# Patient Record
Sex: FEMALE | Employment: OTHER | URBAN - METROPOLITAN AREA
[De-identification: names, ages, dates, MRNs, and addresses within clinical notes are randomized per-mention and may not be internally consistent; named-entity substitution may affect disease eponyms.]

---

## 2017-11-02 ENCOUNTER — PRE VISIT (OUTPATIENT)
Dept: UROLOGY | Facility: CLINIC | Age: 42
End: 2017-11-02

## 2018-01-02 ENCOUNTER — PRE VISIT (OUTPATIENT)
Dept: UROLOGY | Facility: CLINIC | Age: 43
End: 2018-01-02

## 2018-01-02 NOTE — TELEPHONE ENCOUNTER
Patient with history of urethral diverticulum coming in for consult with Dr. Orellana. Patient chart reviewed, no need for call, all records available and ready for appointment.

## 2022-01-25 ENCOUNTER — HOSPITAL ENCOUNTER (EMERGENCY)
Facility: CLINIC | Age: 47
Discharge: HOME | End: 2022-01-25
Attending: EMERGENCY MEDICINE | Admitting: EMERGENCY MEDICINE
Payer: MEDICARE

## 2022-01-25 ENCOUNTER — TRANSFERRED RECORDS (OUTPATIENT)
Dept: HEALTH INFORMATION MANAGEMENT | Facility: CLINIC | Age: 47
End: 2022-01-25

## 2022-01-25 ENCOUNTER — APPOINTMENT (OUTPATIENT)
Dept: CT IMAGING | Facility: CLINIC | Age: 47
End: 2022-01-25
Attending: EMERGENCY MEDICINE
Payer: MEDICARE

## 2022-01-25 VITALS
HEART RATE: 62 BPM | DIASTOLIC BLOOD PRESSURE: 94 MMHG | RESPIRATION RATE: 16 BRPM | WEIGHT: 277 LBS | TEMPERATURE: 97.8 F | OXYGEN SATURATION: 99 % | SYSTOLIC BLOOD PRESSURE: 160 MMHG

## 2022-01-25 DIAGNOSIS — Q60.0 UNILATERAL CONGENITAL ABSENCE OF KIDNEY: ICD-10-CM

## 2022-01-25 DIAGNOSIS — N13.2 HYDRONEPHROSIS WITH URINARY OBSTRUCTION DUE TO RENAL CALCULUS: ICD-10-CM

## 2022-01-25 DIAGNOSIS — N17.9 ACUTE RENAL FAILURE, UNSPECIFIED ACUTE RENAL FAILURE TYPE (H): ICD-10-CM

## 2022-01-25 LAB
ALBUMIN SERPL-MCNC: 2.9 G/DL (ref 3.4–5)
ALP SERPL-CCNC: 78 U/L (ref 40–150)
ALT SERPL W P-5'-P-CCNC: 24 U/L (ref 0–50)
ANION GAP SERPL CALCULATED.3IONS-SCNC: 6 MMOL/L (ref 3–14)
AST SERPL W P-5'-P-CCNC: 18 U/L (ref 0–45)
B-HCG SERPL-ACNC: 3 IU/L (ref 0–5)
BASOPHILS # BLD AUTO: 0 10E3/UL (ref 0–0.2)
BASOPHILS NFR BLD AUTO: 0 %
BILIRUB SERPL-MCNC: 0.5 MG/DL (ref 0.2–1.3)
BUN SERPL-MCNC: 40 MG/DL (ref 7–30)
CALCIUM SERPL-MCNC: 9 MG/DL (ref 8.5–10.1)
CHLORIDE BLD-SCNC: 104 MMOL/L (ref 94–109)
CO2 SERPL-SCNC: 27 MMOL/L (ref 20–32)
CREAT SERPL-MCNC: 4.37 MG/DL (ref 0.52–1.04)
EOSINOPHIL # BLD AUTO: 0.2 10E3/UL (ref 0–0.7)
EOSINOPHIL NFR BLD AUTO: 2 %
ERYTHROCYTE [DISTWIDTH] IN BLOOD BY AUTOMATED COUNT: 11.8 % (ref 10–15)
GFR SERPL CREATININE-BSD FRML MDRD: 12 ML/MIN/1.73M2
GLUCOSE BLD-MCNC: 99 MG/DL (ref 70–99)
HCT VFR BLD AUTO: 44.3 % (ref 35–47)
HGB BLD-MCNC: 14.5 G/DL (ref 11.7–15.7)
HOLD SPECIMEN: NORMAL
IMM GRANULOCYTES # BLD: 0.1 10E3/UL
IMM GRANULOCYTES NFR BLD: 1 %
LACTATE SERPL-SCNC: 0.8 MMOL/L (ref 0.7–2)
LIPASE SERPL-CCNC: 121 U/L (ref 73–393)
LYMPHOCYTES # BLD AUTO: 1.7 10E3/UL (ref 0.8–5.3)
LYMPHOCYTES NFR BLD AUTO: 15 %
MCH RBC QN AUTO: 31.8 PG (ref 26.5–33)
MCHC RBC AUTO-ENTMCNC: 32.7 G/DL (ref 31.5–36.5)
MCV RBC AUTO: 97 FL (ref 78–100)
MONOCYTES # BLD AUTO: 1.1 10E3/UL (ref 0–1.3)
MONOCYTES NFR BLD AUTO: 10 %
NEUTROPHILS # BLD AUTO: 8.1 10E3/UL (ref 1.6–8.3)
NEUTROPHILS NFR BLD AUTO: 72 %
NRBC # BLD AUTO: 0 10E3/UL
NRBC BLD AUTO-RTO: 0 /100
PLATELET # BLD AUTO: 240 10E3/UL (ref 150–450)
POTASSIUM BLD-SCNC: 4 MMOL/L (ref 3.4–5.3)
PROT SERPL-MCNC: 6.9 G/DL (ref 6.8–8.8)
RBC # BLD AUTO: 4.56 10E6/UL (ref 3.8–5.2)
SARS-COV-2 RNA RESP QL NAA+PROBE: NEGATIVE
SODIUM SERPL-SCNC: 137 MMOL/L (ref 133–144)
WBC # BLD AUTO: 11.1 10E3/UL (ref 4–11)

## 2022-01-25 PROCEDURE — 96376 TX/PRO/DX INJ SAME DRUG ADON: CPT

## 2022-01-25 PROCEDURE — 96361 HYDRATE IV INFUSION ADD-ON: CPT

## 2022-01-25 PROCEDURE — 83605 ASSAY OF LACTIC ACID: CPT | Performed by: EMERGENCY MEDICINE

## 2022-01-25 PROCEDURE — 258N000003 HC RX IP 258 OP 636: Performed by: EMERGENCY MEDICINE

## 2022-01-25 PROCEDURE — 99285 EMERGENCY DEPT VISIT HI MDM: CPT | Performed by: EMERGENCY MEDICINE

## 2022-01-25 PROCEDURE — 82374 ASSAY BLOOD CARBON DIOXIDE: CPT | Performed by: EMERGENCY MEDICINE

## 2022-01-25 PROCEDURE — 99285 EMERGENCY DEPT VISIT HI MDM: CPT | Mod: 25

## 2022-01-25 PROCEDURE — 96375 TX/PRO/DX INJ NEW DRUG ADDON: CPT

## 2022-01-25 PROCEDURE — 83690 ASSAY OF LIPASE: CPT | Performed by: EMERGENCY MEDICINE

## 2022-01-25 PROCEDURE — 87635 SARS-COV-2 COVID-19 AMP PRB: CPT | Performed by: EMERGENCY MEDICINE

## 2022-01-25 PROCEDURE — C9803 HOPD COVID-19 SPEC COLLECT: HCPCS

## 2022-01-25 PROCEDURE — 99204 OFFICE O/P NEW MOD 45 MIN: CPT | Mod: 25 | Performed by: UROLOGY

## 2022-01-25 PROCEDURE — 74176 CT ABD & PELVIS W/O CONTRAST: CPT

## 2022-01-25 PROCEDURE — 96374 THER/PROPH/DIAG INJ IV PUSH: CPT

## 2022-01-25 PROCEDURE — 250N000011 HC RX IP 250 OP 636: Performed by: EMERGENCY MEDICINE

## 2022-01-25 PROCEDURE — 84702 CHORIONIC GONADOTROPIN TEST: CPT | Performed by: EMERGENCY MEDICINE

## 2022-01-25 PROCEDURE — 36415 COLL VENOUS BLD VENIPUNCTURE: CPT | Performed by: EMERGENCY MEDICINE

## 2022-01-25 PROCEDURE — 85049 AUTOMATED PLATELET COUNT: CPT | Performed by: EMERGENCY MEDICINE

## 2022-01-25 RX ORDER — DOCUSATE SODIUM 100 MG/1
100 CAPSULE, LIQUID FILLED ORAL
COMMUNITY
Start: 2021-10-11

## 2022-01-25 RX ORDER — ONDANSETRON 2 MG/ML
4 INJECTION INTRAMUSCULAR; INTRAVENOUS EVERY 30 MIN PRN
Status: COMPLETED | OUTPATIENT
Start: 2022-01-25 | End: 2022-01-25

## 2022-01-25 RX ORDER — HYDROMORPHONE HYDROCHLORIDE 1 MG/ML
0.5 INJECTION, SOLUTION INTRAMUSCULAR; INTRAVENOUS; SUBCUTANEOUS
Status: COMPLETED | OUTPATIENT
Start: 2022-01-25 | End: 2022-01-25

## 2022-01-25 RX ORDER — METOPROLOL SUCCINATE 100 MG/1
100 TABLET, EXTENDED RELEASE ORAL AT BEDTIME
COMMUNITY
Start: 2021-08-09

## 2022-01-25 RX ORDER — ACETAMINOPHEN AND CODEINE PHOSPHATE 120; 12 MG/5ML; MG/5ML
1 SOLUTION ORAL AT BEDTIME
COMMUNITY
Start: 2021-09-14 | End: 2022-09-14

## 2022-01-25 RX ORDER — NYSTATIN 100000 U/G
OINTMENT TOPICAL
COMMUNITY
Start: 2021-11-22

## 2022-01-25 RX ORDER — MULTIVITAMIN/IRON/FOLIC ACID 18MG-0.4MG
1 TABLET ORAL AT BEDTIME
COMMUNITY

## 2022-01-25 RX ORDER — PYRIDOXINE HCL (VITAMIN B6) 50 MG
50 TABLET ORAL
COMMUNITY
Start: 2021-06-01

## 2022-01-25 RX ORDER — ONDANSETRON 4 MG/1
TABLET, ORALLY DISINTEGRATING ORAL
COMMUNITY
Start: 2021-03-09

## 2022-01-25 RX ORDER — GABAPENTIN 300 MG/1
CAPSULE ORAL
COMMUNITY
Start: 2021-08-02

## 2022-01-25 RX ORDER — SODIUM CHLORIDE 9 MG/ML
INJECTION, SOLUTION INTRAVENOUS CONTINUOUS
Status: DISCONTINUED | OUTPATIENT
Start: 2022-01-25 | End: 2022-01-25 | Stop reason: HOSPADM

## 2022-01-25 RX ORDER — FAMOTIDINE 20 MG/1
20 TABLET, FILM COATED ORAL AT BEDTIME
COMMUNITY
Start: 2021-12-20

## 2022-01-25 RX ADMIN — SODIUM CHLORIDE 1000 ML: 9 INJECTION, SOLUTION INTRAVENOUS at 13:12

## 2022-01-25 RX ADMIN — ONDANSETRON 4 MG: 2 INJECTION INTRAMUSCULAR; INTRAVENOUS at 13:12

## 2022-01-25 RX ADMIN — HYDROMORPHONE HYDROCHLORIDE 0.5 MG: 1 INJECTION, SOLUTION INTRAMUSCULAR; INTRAVENOUS; SUBCUTANEOUS at 14:22

## 2022-01-25 RX ADMIN — HYDROMORPHONE HYDROCHLORIDE 0.5 MG: 1 INJECTION, SOLUTION INTRAMUSCULAR; INTRAVENOUS; SUBCUTANEOUS at 13:13

## 2022-01-25 RX ADMIN — HYDROMORPHONE HYDROCHLORIDE 0.5 MG: 1 INJECTION, SOLUTION INTRAMUSCULAR; INTRAVENOUS; SUBCUTANEOUS at 15:24

## 2022-01-25 RX ADMIN — ONDANSETRON 4 MG: 2 INJECTION INTRAMUSCULAR; INTRAVENOUS at 15:23

## 2022-01-25 RX ADMIN — ONDANSETRON 4 MG: 2 INJECTION INTRAMUSCULAR; INTRAVENOUS at 14:22

## 2022-01-25 NOTE — DISCHARGE INSTRUCTIONS
As discussed you have obstruction of your ureter on the right side causing some degree of kidney failure that should resolve with treatment of the obstruction.  You have a 1 cm kidney stone blocking the ureter.  Go directly to the [facility] do not eat or drink anything in route.  They will take you to the operating room as soon as possible after arrival.  If things go well you will be discharged as an outpatient there.  I hope this gets resolved today and you have no further trouble.  It was a pleasure meeting you.

## 2022-01-25 NOTE — H&P (VIEW-ONLY)
History     Chief Complaint   Patient presents with     Abdominal Pain     Flank pain. Constipation      HPI  Yaima Maxwell is a 46 year old female who presents to the emergency department secondary to abdominal pain.  This started yesterday morning and has been waxing waning in intensity.  Is primarily located in the right lower quadrant but goes up into the right upper quadrant and into the right flank.  No dysuria or hematuria.  She has had decreased urinary output.  She has not urinated since yesterday morning.  She has had constipation with hard pebble-like stools.  No recent complete evacuation.  She had some bowel movement about an hour prior to arrival.  No blood in her stool.  She has had some lack of appetite and some vomiting.  No abdominal distention.  She just had gastric bypass surgery in September.  No history of bowel obstructions.  She has not had a fever.  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  She has been pregnant 6 times and has had 4 miscarriages.  She has not had a menses for 53 days.  She is on oral contraceptive pills.    Allergies:  Allergies   Allergen Reactions     Imitrex [Sumatriptan] Anaphylaxis       Problem List:    There are no problems to display for this patient.       Past Medical History:    No past medical history on file.    Past Surgical History:    No past surgical history on file.    Family History:    No family history on file.    Social History:  Marital Status:   [2]  Social History     Tobacco Use     Smoking status: Not on file     Smokeless tobacco: Not on file   Substance Use Topics     Alcohol use: Not on file     Drug use: Not on file        Medications:    amitriptyline (ELAVIL) 25 MG tablet  cholecalciferol 50 MCG (2000 UT) CAPS  Cranberry 400 MG CAPS  cyanocobalamin 1000 MCG SUBL  docusate sodium (DSS) 100 MG capsule  famotidine (PEPCID) 20 MG tablet  gabapentin (NEURONTIN) 300 MG capsule  metoprolol succinate ER (TOPROL-XL) 100 MG 24  hr tablet  multivitamin w/minerals (CENTRUM ADULTS) tablet  norethindrone (MICRONOR) 0.35 MG tablet  nystatin (MYCOSTATIN) 880755 UNIT/GM external ointment  omeprazole (PRILOSEC) 20 MG DR capsule  ondansetron (ZOFRAN-ODT) 4 MG ODT tab  pyridOXINE (VITAMIN B-6) 50 MG tablet  Rimegepant Sulfate 75 MG TBDP          Review of Systems   All other systems reviewed and are negative.      Physical Exam   BP: (!) 162/100  Pulse: 59  Temp: 97.8  F (36.6  C)  Resp: 16  Weight: 125.6 kg (277 lb)  SpO2: 100 %      Physical Exam  Vitals and nursing note reviewed.   Constitutional:       General: She is not in acute distress.     Appearance: She is well-developed. She is not diaphoretic.   HENT:      Head: Normocephalic and atraumatic.   Eyes:      General: No scleral icterus.     Extraocular Movements: Extraocular movements intact.      Pupils: Pupils are equal, round, and reactive to light.   Cardiovascular:      Rate and Rhythm: Normal rate and regular rhythm.      Heart sounds: Normal heart sounds. No murmur heard.      Pulmonary:      Effort: Pulmonary effort is normal.      Breath sounds: Normal breath sounds.   Abdominal:      Tenderness: There is abdominal tenderness in the right upper quadrant and right lower quadrant. There is right CVA tenderness. There is no left CVA tenderness, guarding or rebound. Positive signs include McBurney's sign. Negative signs include Ayala's sign and Rovsing's sign.   Musculoskeletal:      Cervical back: Normal range of motion and neck supple.   Skin:     General: Skin is warm and dry.      Coloration: Skin is not pale.      Findings: No erythema or rash.   Neurological:      Mental Status: She is alert and oriented to person, place, and time.         ED Course                 Procedures                  Results for orders placed or performed during the hospital encounter of 01/25/22 (from the past 24 hour(s))   Extra Tube    Narrative    The following orders were created for panel order  Extra Tube.  Procedure                               Abnormality         Status                     ---------                               -----------         ------                     Extra Red Top Tube[[id number]]                               Final result                 Please view results for these tests on the individual orders.   Extra Red Top Tube   Result Value Ref Range    Hold Specimen [facility]    CBC with platelets differential    Narrative    The following orders were created for panel order CBC with platelets differential.  Procedure                               Abnormality         Status                     ---------                               -----------         ------                     CBC with platelets and d...[[id number]]  Abnormal            Final result                 Please view results for these tests on the individual orders.   Comprehensive metabolic panel   Result Value Ref Range    Sodium 137 133 - 144 mmol/L    Potassium 4.0 3.4 - 5.3 mmol/L    Chloride 104 94 - 109 mmol/L    Carbon Dioxide (CO2) 27 20 - 32 mmol/L    Anion Gap 6 3 - 14 mmol/L    Urea Nitrogen 40 (H) 7 - 30 mg/dL    Creatinine 4.37 (H) 0.52 - 1.04 mg/dL    Calcium 9.0 8.5 - 10.1 mg/dL    Glucose 99 70 - 99 mg/dL    Alkaline Phosphatase 78 40 - 150 U/L    AST 18 0 - 45 U/L    ALT 24 0 - 50 U/L    Protein Total 6.9 6.8 - 8.8 g/dL    Albumin 2.9 (L) 3.4 - 5.0 g/dL    Bilirubin Total 0.5 0.2 - 1.3 mg/dL    GFR Estimate 12 (L) >60 mL/min/1.73m2   Lipase   Result Value Ref Range    Lipase 121 73 - 393 U/L   Lactic acid whole blood   Result Value Ref Range    Lactic Acid 0.8 0.7 - 2.0 mmol/L   HCG quantitative pregnancy (blood)   Result Value Ref Range    hCG Quantitative 3 0 - 5 IU/L   CBC with platelets and differential   Result Value Ref Range    WBC Count 11.1 (H) 4.0 - 11.0 10e3/uL    RBC Count 4.56 3.80 - 5.20 10e6/uL    Hemoglobin 14.5 11.7 - 15.7 g/dL    Hematocrit 44.3 35.0 - 47.0 %    MCV 97 78 - 100 fL    MCH 31.8  26.5 - 33.0 pg    MCHC 32.7 31.5 - 36.5 g/dL    RDW 11.8 10.0 - 15.0 %    Platelet Count 240 150 - 450 10e3/uL    % Neutrophils 72 %    % Lymphocytes 15 %    % Monocytes 10 %    % Eosinophils 2 %    % Basophils 0 %    % Immature Granulocytes 1 %    NRBCs per 100 WBC 0 <1 /100    Absolute Neutrophils 8.1 1.6 - 8.3 10e3/uL    Absolute Lymphocytes 1.7 0.8 - 5.3 10e3/uL    Absolute Monocytes 1.1 0.0 - 1.3 10e3/uL    Absolute Eosinophils 0.2 0.0 - 0.7 10e3/uL    Absolute Basophils 0.0 0.0 - 0.2 10e3/uL    Absolute Immature Granulocytes 0.1 <=0.4 10e3/uL    Absolute NRBCs 0.0 10e3/uL   CT Abdomen Pelvis w/o Contrast    Narrative    CT ABDOMEN/PELVIS WITHOUT CONTRAST January 25, 2022 2:19 PM    CLINICAL HISTORY: Renal failure, solitary kidney, gastric bypass,  right-sided pain.    TECHNIQUE: CT scan of the abdomen and pelvis was performed without IV  contrast. Multiplanar reformats were obtained. Dose reduction  techniques were used.  CONTRAST: None.    COMPARISON: None.    FINDINGS:   LOWER CHEST: Tiny bilateral pleural fluid collections are noted.  Visualized portions of the lung bases and mediastinal contents are  otherwise grossly unremarkable.    HEPATOBILIARY: Mild focal fatty infiltration is seen in the liver  adjacent to falciform ligament. Gallbladder is distended but otherwise  unremarkable. Liver is otherwise normal in appearance. No focal  intrahepatic lesion, biliary ductal dilatation, choledocholithiasis,  or cholelithiasis is seen.    PANCREAS: Normal.    SPLEEN: Calcifications posterior aspect of the spleen is likely from  chronic granulomatous disease. Spleen is enlarged measuring 10.3 x  11.0 x 9.1 cm in the craniocaudal, transverse, and AP dimensions  respectively.    ADRENAL GLANDS: Normal.    KIDNEYS/BLADDER: There is mild right perinephric stranding. There is  mild to moderate right hydronephrosis and pyelectasis. There is a  right ureteropelvic junction calculus measuring 1.1 x 0.7 x 1.2 cm.  The  right ureters otherwise have normal caliber distally to this  location. Urinary bladder is nearly completely decompressed and cannot  be completely evaluated.    BOWEL: Colon, small bowel, and appendix are grossly of normal caliber  and appearance. Postop changes status post prior gastric surgery are  noted. This does not have the typical appearance of a Andrew-en-Y  bypass. This could represent a gastric sleeve.    LYMPH NODES: Normal.    VASCULATURE: There is mild nonaneurysmal atherosclerosis.    PELVIC ORGANS: Uterus and ovaries are grossly unremarkable.    OTHER: There is a small amount of free fluid in the pelvis. No free  air is identified.    MUSCULOSKELETAL: There are degenerative changes in the lower lumbar  spine. No aggressive osseous lesions or acute osseous fractures are  identified.      Impression    IMPRESSION:   1.  At least partially obstructive right ureteropelvic junction  calculus measures 1.1 x 0.7 x 1.2 cm. There is no left kidney. Further  evaluation with urologic consultation is recommended.  2.  Postoperative changes of the stomach from probable prior gastric  sleeve procedure.  3.  Evidence of chronic granulomatous disease of the spleen. There is  mild splenomegaly.    I discussed the findings of the obstructive right ureteropelvic  junction calculus with Dr. Hall on 1/25/2022 at 2:30 PM.       Medications   0.9% sodium chloride BOLUS (1,000 mLs Intravenous New Bag 1/25/22 1312)     Followed by   sodium chloride 0.9% infusion (has no administration in time range)   HYDROmorphone (PF) (DILAUDID) injection 0.5 mg (0.5 mg Intravenous Given 1/25/22 1422)   ondansetron (ZOFRAN) injection 4 mg (4 mg Intravenous Given 1/25/22 1422)       Assessments & Plan (with Medical Decision Making)  Right-sided abdominal pain, constipation, no menses for 53 days, congenital solitary kidney, congenital duplication of uterus.  Work-up revealed an obstructed right ureter at the UPJ secondary to a 1 cm x 7 mm x  1.2 cm obstructing stone with hydronephrosis.  Given she is only has 1 kidney this is resulted in renal failure.  She does not have any fever.  She was unable to produce urine for a urinalysis.  Discussed with Dr. Fontanez of urology who wanted the patient to come directly to [facility] for an outpatient surgical procedure with stent placement to relieve the obstruction.  The patient is n.p.o. since last night but has had a few sips of water today.  I advised her not to eat or drink anything from here forward.  Her  can drive her.  The IV was left in place.  Instructions to go to [facility] directly were given.  She understands the plan.  All questions were answered.  Surgery is currently planned for 5 PM but I advised her to go directly there.     I have reviewed the nursing notes.    I have reviewed the findings, diagnosis, plan and need for follow up with the patient.      New Prescriptions    No medications on file       Final diagnoses:   Hydronephrosis with urinary obstruction due to renal calculus   Acute renal failure, unspecified acute renal failure type (H)   Unilateral congenital absence of kidney       1/25/2022   [facility] EMERGENCY DEPT     Chau Hall MD  01/25/22 0889

## 2022-01-25 NOTE — ED NOTES
Pt discharged. Pt to go directly to [facility] for surgery as outpt. IV in place and covered. Dilaudid given prior to discharge.Vss stable. Pt and  feel comfortable with plan.

## 2022-01-25 NOTE — ED PROVIDER NOTES
History     Chief Complaint   Patient presents with     Abdominal Pain     Flank pain. Constipation      HPI  Yaima Maxwell is a 46 year old female who presents to the emergency department secondary to abdominal pain.  This started yesterday morning and has been waxing waning in intensity.  Is primarily located in the right lower quadrant but goes up into the right upper quadrant and into the right flank.  No dysuria or hematuria.  She has had decreased urinary output.  She has not urinated since yesterday morning.  She has had constipation with hard pebble-like stools.  No recent complete evacuation.  She had some bowel movement about an hour prior to arrival.  No blood in her stool.  She has had some lack of appetite and some vomiting.  No abdominal distention.  She just had gastric bypass surgery in September.  No history of bowel obstructions.  She has not had a fever.  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  She has been pregnant 6 times and has had 4 miscarriages.  She has not had a menses for 53 days.  She is on oral contraceptive pills.    Allergies:  Allergies   Allergen Reactions     Imitrex [Sumatriptan] Anaphylaxis       Problem List:    There are no problems to display for this patient.       Past Medical History:    No past medical history on file.    Past Surgical History:    No past surgical history on file.    Family History:    No family history on file.    Social History:  Marital Status:   [2]  Social History     Tobacco Use     Smoking status: Not on file     Smokeless tobacco: Not on file   Substance Use Topics     Alcohol use: Not on file     Drug use: Not on file        Medications:    amitriptyline (ELAVIL) 25 MG tablet  cholecalciferol 50 MCG (2000 UT) CAPS  Cranberry 400 MG CAPS  cyanocobalamin 1000 MCG SUBL  docusate sodium (DSS) 100 MG capsule  famotidine (PEPCID) 20 MG tablet  gabapentin (NEURONTIN) 300 MG capsule  metoprolol succinate ER (TOPROL-XL) 100 MG 24  hr tablet  multivitamin w/minerals (CENTRUM ADULTS) tablet  norethindrone (MICRONOR) 0.35 MG tablet  nystatin (MYCOSTATIN) 200932 UNIT/GM external ointment  omeprazole (PRILOSEC) 20 MG DR capsule  ondansetron (ZOFRAN-ODT) 4 MG ODT tab  pyridOXINE (VITAMIN B-6) 50 MG tablet  Rimegepant Sulfate 75 MG TBDP          Review of Systems   All other systems reviewed and are negative.      Physical Exam   BP: (!) 162/100  Pulse: 59  Temp: 97.8  F (36.6  C)  Resp: 16  Weight: 125.6 kg (277 lb)  SpO2: 100 %      Physical Exam  Vitals and nursing note reviewed.   Constitutional:       General: She is not in acute distress.     Appearance: She is well-developed. She is not diaphoretic.   HENT:      Head: Normocephalic and atraumatic.   Eyes:      General: No scleral icterus.     Extraocular Movements: Extraocular movements intact.      Pupils: Pupils are equal, round, and reactive to light.   Cardiovascular:      Rate and Rhythm: Normal rate and regular rhythm.      Heart sounds: Normal heart sounds. No murmur heard.      Pulmonary:      Effort: Pulmonary effort is normal.      Breath sounds: Normal breath sounds.   Abdominal:      Tenderness: There is abdominal tenderness in the right upper quadrant and right lower quadrant. There is right CVA tenderness. There is no left CVA tenderness, guarding or rebound. Positive signs include McBurney's sign. Negative signs include Ayala's sign and Rovsing's sign.   Musculoskeletal:      Cervical back: Normal range of motion and neck supple.   Skin:     General: Skin is warm and dry.      Coloration: Skin is not pale.      Findings: No erythema or rash.   Neurological:      Mental Status: She is alert and oriented to person, place, and time.         ED Course                 Procedures                  Results for orders placed or performed during the hospital encounter of 01/25/22 (from the past 24 hour(s))   Extra Tube    Narrative    The following orders were created for panel order  Extra Tube.  Procedure                               Abnormality         Status                     ---------                               -----------         ------                     Extra Red Top Tube[[id number]]                               Final result                 Please view results for these tests on the individual orders.   Extra Red Top Tube   Result Value Ref Range    Hold Specimen [facility]    CBC with platelets differential    Narrative    The following orders were created for panel order CBC with platelets differential.  Procedure                               Abnormality         Status                     ---------                               -----------         ------                     CBC with platelets and d...[[id number]]  Abnormal            Final result                 Please view results for these tests on the individual orders.   Comprehensive metabolic panel   Result Value Ref Range    Sodium 137 133 - 144 mmol/L    Potassium 4.0 3.4 - 5.3 mmol/L    Chloride 104 94 - 109 mmol/L    Carbon Dioxide (CO2) 27 20 - 32 mmol/L    Anion Gap 6 3 - 14 mmol/L    Urea Nitrogen 40 (H) 7 - 30 mg/dL    Creatinine 4.37 (H) 0.52 - 1.04 mg/dL    Calcium 9.0 8.5 - 10.1 mg/dL    Glucose 99 70 - 99 mg/dL    Alkaline Phosphatase 78 40 - 150 U/L    AST 18 0 - 45 U/L    ALT 24 0 - 50 U/L    Protein Total 6.9 6.8 - 8.8 g/dL    Albumin 2.9 (L) 3.4 - 5.0 g/dL    Bilirubin Total 0.5 0.2 - 1.3 mg/dL    GFR Estimate 12 (L) >60 mL/min/1.73m2   Lipase   Result Value Ref Range    Lipase 121 73 - 393 U/L   Lactic acid whole blood   Result Value Ref Range    Lactic Acid 0.8 0.7 - 2.0 mmol/L   HCG quantitative pregnancy (blood)   Result Value Ref Range    hCG Quantitative 3 0 - 5 IU/L   CBC with platelets and differential   Result Value Ref Range    WBC Count 11.1 (H) 4.0 - 11.0 10e3/uL    RBC Count 4.56 3.80 - 5.20 10e6/uL    Hemoglobin 14.5 11.7 - 15.7 g/dL    Hematocrit 44.3 35.0 - 47.0 %    MCV 97 78 - 100 fL    MCH 31.8  26.5 - 33.0 pg    MCHC 32.7 31.5 - 36.5 g/dL    RDW 11.8 10.0 - 15.0 %    Platelet Count 240 150 - 450 10e3/uL    % Neutrophils 72 %    % Lymphocytes 15 %    % Monocytes 10 %    % Eosinophils 2 %    % Basophils 0 %    % Immature Granulocytes 1 %    NRBCs per 100 WBC 0 <1 /100    Absolute Neutrophils 8.1 1.6 - 8.3 10e3/uL    Absolute Lymphocytes 1.7 0.8 - 5.3 10e3/uL    Absolute Monocytes 1.1 0.0 - 1.3 10e3/uL    Absolute Eosinophils 0.2 0.0 - 0.7 10e3/uL    Absolute Basophils 0.0 0.0 - 0.2 10e3/uL    Absolute Immature Granulocytes 0.1 <=0.4 10e3/uL    Absolute NRBCs 0.0 10e3/uL   CT Abdomen Pelvis w/o Contrast    Narrative    CT ABDOMEN/PELVIS WITHOUT CONTRAST January 25, 2022 2:19 PM    CLINICAL HISTORY: Renal failure, solitary kidney, gastric bypass,  right-sided pain.    TECHNIQUE: CT scan of the abdomen and pelvis was performed without IV  contrast. Multiplanar reformats were obtained. Dose reduction  techniques were used.  CONTRAST: None.    COMPARISON: None.    FINDINGS:   LOWER CHEST: Tiny bilateral pleural fluid collections are noted.  Visualized portions of the lung bases and mediastinal contents are  otherwise grossly unremarkable.    HEPATOBILIARY: Mild focal fatty infiltration is seen in the liver  adjacent to falciform ligament. Gallbladder is distended but otherwise  unremarkable. Liver is otherwise normal in appearance. No focal  intrahepatic lesion, biliary ductal dilatation, choledocholithiasis,  or cholelithiasis is seen.    PANCREAS: Normal.    SPLEEN: Calcifications posterior aspect of the spleen is likely from  chronic granulomatous disease. Spleen is enlarged measuring 10.3 x  11.0 x 9.1 cm in the craniocaudal, transverse, and AP dimensions  respectively.    ADRENAL GLANDS: Normal.    KIDNEYS/BLADDER: There is mild right perinephric stranding. There is  mild to moderate right hydronephrosis and pyelectasis. There is a  right ureteropelvic junction calculus measuring 1.1 x 0.7 x 1.2 cm.  The  right ureters otherwise have normal caliber distally to this  location. Urinary bladder is nearly completely decompressed and cannot  be completely evaluated.    BOWEL: Colon, small bowel, and appendix are grossly of normal caliber  and appearance. Postop changes status post prior gastric surgery are  noted. This does not have the typical appearance of a Andrew-en-Y  bypass. This could represent a gastric sleeve.    LYMPH NODES: Normal.    VASCULATURE: There is mild nonaneurysmal atherosclerosis.    PELVIC ORGANS: Uterus and ovaries are grossly unremarkable.    OTHER: There is a small amount of free fluid in the pelvis. No free  air is identified.    MUSCULOSKELETAL: There are degenerative changes in the lower lumbar  spine. No aggressive osseous lesions or acute osseous fractures are  identified.      Impression    IMPRESSION:   1.  At least partially obstructive right ureteropelvic junction  calculus measures 1.1 x 0.7 x 1.2 cm. There is no left kidney. Further  evaluation with urologic consultation is recommended.  2.  Postoperative changes of the stomach from probable prior gastric  sleeve procedure.  3.  Evidence of chronic granulomatous disease of the spleen. There is  mild splenomegaly.    I discussed the findings of the obstructive right ureteropelvic  junction calculus with Dr. Hall on 1/25/2022 at 2:30 PM.       Medications   0.9% sodium chloride BOLUS (1,000 mLs Intravenous New Bag 1/25/22 1312)     Followed by   sodium chloride 0.9% infusion (has no administration in time range)   HYDROmorphone (PF) (DILAUDID) injection 0.5 mg (0.5 mg Intravenous Given 1/25/22 1422)   ondansetron (ZOFRAN) injection 4 mg (4 mg Intravenous Given 1/25/22 1422)       Assessments & Plan (with Medical Decision Making)  Right-sided abdominal pain, constipation, no menses for 53 days, congenital solitary kidney, congenital duplication of uterus.  Work-up revealed an obstructed right ureter at the UPJ secondary to a 1 cm x 7 mm x  1.2 cm obstructing stone with hydronephrosis.  Given she is only has 1 kidney this is resulted in renal failure.  She does not have any fever.  She was unable to produce urine for a urinalysis.  Discussed with Dr. Fontanez of urology who wanted the patient to come directly to [facility] for an outpatient surgical procedure with stent placement to relieve the obstruction.  The patient is n.p.o. since last night but has had a few sips of water today.  I advised her not to eat or drink anything from here forward.  Her  can drive her.  The IV was left in place.  Instructions to go to [facility] directly were given.  She understands the plan.  All questions were answered.  Surgery is currently planned for 5 PM but I advised her to go directly there.     I have reviewed the nursing notes.    I have reviewed the findings, diagnosis, plan and need for follow up with the patient.      New Prescriptions    No medications on file       Final diagnoses:   Hydronephrosis with urinary obstruction due to renal calculus   Acute renal failure, unspecified acute renal failure type (H)   Unilateral congenital absence of kidney       1/25/2022   [facility] EMERGENCY DEPT     Chau Hall MD  01/25/22 1105

## 2022-01-25 NOTE — ED TRIAGE NOTES
Pt presents with RLQ pain radiating to flank. Pt concerned as she has only one kidney. Pt notes decreased urine out put. Started yesterday. Pt had bariatric surgery in sept. Pt feeling constipated.    .

## 2022-01-25 NOTE — CONSULTS
S: Patient seen immediately upon consultation with regard to patient's renal failure and obstructive ureteral stone.  Patient was seen in ER due to abdominal pain starting yesterday.  Pain has been off and on.  It is mainly in the right side.  She has no hematuria or dysuria.  She has no fevers advised she has decreased urine output since yesterday and has not made much urine.  CT scan showed an obstructed stone in the right upper ureter with hydronephrosis.  She only had 1 kidney with today's creatinine show value of 4.37..  She has no history of kidney stones or kidney diseases in the past.  She is status post partial gastrectomy several months ago obesity.  She has lost over 80 pounds.  Current Outpatient Medications   Medication Sig Dispense Refill     amitriptyline (ELAVIL) 25 MG tablet Take 50 mg by mouth At Bedtime        cholecalciferol 50 MCG (2000 UT) CAPS Take 2,000 Units by mouth At Bedtime        Cranberry 400 MG CAPS Take 800 mg by mouth daily       cyanocobalamin 1000 MCG SUBL Place 1,000 mcg under the tongue every 7 days Sundays       docusate sodium (DSS) 100 MG capsule Take 100 mg by mouth       famotidine (PEPCID) 20 MG tablet Take 20 mg by mouth At Bedtime       gabapentin (NEURONTIN) 300 MG capsule 300 mg morning, 900 mg hs       metoprolol succinate ER (TOPROL-XL) 100 MG 24 hr tablet Take 100 mg by mouth At Bedtime        multivitamin w/minerals (CENTRUM ADULTS) tablet Take 1 tablet by mouth At Bedtime        norethindrone (MICRONOR) 0.35 MG tablet Take 1 tablet by mouth At Bedtime        nystatin (MYCOSTATIN) 169844 UNIT/GM external ointment Apply to skin rash BID PRN.       omeprazole (PRILOSEC) 20 MG DR capsule Take 20 mg by mouth       ondansetron (ZOFRAN-ODT) 4 MG ODT tab 1 to 2 tabs by mouth up to twice a day as needed for nausea symptoms related to headache. Max 9 days per month.       pyridOXINE (VITAMIN B-6) 50 MG tablet Take 50 mg by mouth       Rimegepant Sulfate 75 MG TBDP Take 75  "mg by mouth once as needed for migraine \"Nurtec\" max 1/24 hours, 8/ 30 days       Allergies   Allergen Reactions     Imitrex [Sumatriptan] Anaphylaxis     No past medical history on file.  No past surgical history on file.   No family history on file.  Social History     Socioeconomic History     Marital status:      Spouse name: Not on file     Number of children: Not on file     Years of education: Not on file     Highest education level: Not on file   Occupational History     Not on file   Tobacco Use     Smoking status: Not on file     Smokeless tobacco: Not on file   Substance and Sexual Activity     Alcohol use: Not on file     Drug use: Not on file     Sexual activity: Not on file   Other Topics Concern     Not on file   Social History Narrative     Not on file     Social Determinants of Health     Financial Resource Strain: Not on file   Food Insecurity: Not on file   Transportation Needs: Not on file   Physical Activity: Not on file   Stress: Not on file   Social Connections: Not on file   Intimate Partner Violence: Not on file   Housing Stability: Not on file       REVIEW OF SYSTEMS  =================  C: NEGATIVE for fever, chills, change in weight  I: NEGATIVE for worrisome rashes, moles or lesions  E/M: NEGATIVE for ear, mouth and throat problems  R: NEGATIVE for significant cough or SHORTNESS OF BREATH  CV:  NEGATIVE for chest pain, palpitations or peripheral edema  GI: NEGATIVE for nausea, abdominal pain, heartburn, or change in bowel habits  NEURO: NEGATIVE numbness/weakness  : see HPI  PSYCH: NEGATIVE depression/anxiety  LYmph: no new enlarged lymph nodes  Ortho: no new trauma/movements      Physical Exam:  BP (!) 160/94   Pulse 62   Temp 97.8  F (36.6  C) (Oral)   Resp 16   Wt 125.6 kg (277 lb)   LMP 12/01/2021   SpO2 99%    Patient is pleasant, in no acute distress, good general condition.  Heart:  negative, PMI normal  Lung: no evidence of respiratory distress    Abdomen: Soft, " nondistended, non tender. No masses. No rebound or guarding.   Exam: no cva tenderness  Skin: Warm and dry.  No redness.  Neuro: grossly normal  Musculaskeletal: moving all extremities  Psych normal mood and affect  Musculoskeletal  moving all extremities  Hematologic/Lymphatic/Immunologic: normal ant/post cervical, axillary, supraclavicular and inguinal nodes    Assessment/Plan:       46-year-old  female with obstructive right ureteral stone complicating an acute renal stone.  Treatment options discussed.  I will schedule patient for cystoscopy and stent placement today to remove the obstruction.  Surgical options for stone discussed which include but not limited to ureteroscopy of ESWL after renal function improves.

## 2022-01-26 ENCOUNTER — TELEPHONE (OUTPATIENT)
Dept: UROLOGY | Facility: CLINIC | Age: 47
End: 2022-01-26

## 2022-01-26 ENCOUNTER — LAB (OUTPATIENT)
Dept: LAB | Facility: CLINIC | Age: 47
End: 2022-01-26
Payer: MEDICARE

## 2022-01-26 DIAGNOSIS — N17.9 ACUTE RENAL FAILURE (ARF) (H): Primary | ICD-10-CM

## 2022-01-26 DIAGNOSIS — N17.9 ACUTE RENAL FAILURE, UNSPECIFIED ACUTE RENAL FAILURE TYPE (H): ICD-10-CM

## 2022-01-26 DIAGNOSIS — N17.9 ACUTE RENAL FAILURE, UNSPECIFIED ACUTE RENAL FAILURE TYPE (H): Primary | ICD-10-CM

## 2022-01-26 LAB
ANION GAP SERPL CALCULATED.3IONS-SCNC: 8 MMOL/L (ref 3–14)
BUN SERPL-MCNC: 43 MG/DL (ref 7–30)
CALCIUM SERPL-MCNC: 9 MG/DL (ref 8.5–10.1)
CHLORIDE BLD-SCNC: 107 MMOL/L (ref 94–109)
CO2 SERPL-SCNC: 25 MMOL/L (ref 20–32)
CREAT SERPL-MCNC: 3.24 MG/DL (ref 0.52–1.04)
GFR SERPL CREATININE-BSD FRML MDRD: 17 ML/MIN/1.73M2
GLUCOSE BLD-MCNC: 109 MG/DL (ref 70–99)
POTASSIUM BLD-SCNC: 4.1 MMOL/L (ref 3.4–5.3)
SODIUM SERPL-SCNC: 140 MMOL/L (ref 133–144)

## 2022-01-26 PROCEDURE — 36415 COLL VENOUS BLD VENIPUNCTURE: CPT

## 2022-01-26 PROCEDURE — 80048 BASIC METABOLIC PNL TOTAL CA: CPT

## 2022-01-26 NOTE — TELEPHONE ENCOUNTER
"Called and spoke with patient. She states she saw the labs online but wanted interpretation of the meaning. Per Dr. Fontanez's note from 1/26/22 results:    \"This is good.  Repeat lab again next week  Encourage increase fluid (hydration)\"    Writer reviewed his recommendations with patient. Orders placed for BMP for next week. Pt states she is looking at getting this done on Monday.    Danielle HOUSTON RN Urology 1/26/2022 3:14 PM    "

## 2022-01-27 ENCOUNTER — NURSE TRIAGE (OUTPATIENT)
Dept: NURSING | Facility: CLINIC | Age: 47
End: 2022-01-27
Payer: MEDICARE

## 2022-01-28 NOTE — TELEPHONE ENCOUNTER
Pt is calling.    Kidney stent put in a few days ago, 01/25/2022.  Flank pain has increased every time she has to urinate.  Pt has tried to decrease her Norco as well.   Denies fever.  Taking Pyridium, so unsure there is more blood in her urine.  Some nausea, no vomiting.  Flank pain will calm down, but flares up when she urinates.  No blood clots seen in the urine.      Reassurance given. Care advice reviewed. This can be normal after stent placement.  You may notice is more since cutting back on the Norco.  Call back with fever, clots seen in the urine, vomiting, if pain worsens, or if it occurs all of the time.  She verbalized understanding and will follow care advice.        COVID 19 Nurse Triage Plan/Patient Instructions    Please be aware that novel coronavirus (COVID-19) may be circulating in the community. If you develop symptoms such as fever, cough, or SOB or if you have concerns about the presence of another infection including coronavirus (COVID-19), please contact your health care provider or visit [email or web address].     Disposition/Instructions    Home care recommended. Follow home care protocol based instructions.    Thank you for taking steps to prevent the spread of this virus.  o Limit your contact with others.  o Wear a simple mask to cover your cough.  o Wash your hands well and often.    Resources    [facility]: About COVID-19: [email or web address]    CDC: What to Do If You're Sick: www.cdc.gov/coronavirus/2019-ncov/about/steps-when-sick.html    CDC: Ending Home Isolation: www.cdc.gov/coronavirus/2019-ncov/hcp/disposition-in-home-patients.html     CDC: Caring for Someone: www.cdc.gov/coronavirus/2019-ncov/if-you-are-sick/care-for-someone.html     [facility]: Interim Guidance for Hospital Discharge to Home: www.[email or web address].[email or web address].[email or web address]/diseases/coronavirus/hcp/hospdischarge.pdf    [facility] clinical trials (COVID-19 research studies):  [email or web address]-clinical-trials     Below are the COVID-19 hotlines at the [place] Department of Health ([facility]). Interpreters are available.   o For health questions: Call 633-607-1275 or 1-116.801.6317 (7 a.m. to 7 p.m.)  o For questions about schools and childcare: Call 571-880-5219 or 1-417.800.2527 (7 a.m. to 7 p.m.)   Reason for Disposition    History of kidney stones    Additional Information    Negative: Passed out (i.e., lost consciousness, collapsed and was not responding)    Negative: Shock suspected (e.g., cold/pale/clammy skin, too weak to stand, low BP, rapid pulse)    Negative: Difficult to awaken or acting confused (e.g., disoriented, slurred speech)    Negative: Sounds like a life-threatening emergency to the triager    Negative: Followed a major injury to the back (e.g., MVA, fall > 10 feet or 3 meters, penetrating injury, etc.)    Negative: Back pain or flank pain during pregnancy    Negative: Upper, mid or lower back pain that occurs mainly in the midline    Negative: [1] SEVERE pain (e.g., excruciating, scale 8-10) AND [2] present > 1 hour    Negative: [1] SEVERE pain (e.g., excruciating, scale 8-10) AND [2] not improved after pain medicine    Negative: [1] Sudden onset of severe flank pain AND [2] age > 60    Negative: [1] Abdominal pain AND [2] age > 60    Negative: [1] Unable to urinate (or only a few drops) > 4 hours AND     [2] bladder feels very full (e.g., palpable bladder or strong urge to urinate)    Negative: Vomiting    Negative: Weakness of a leg or foot (e.g., unable to bear weight, dragging foot)    Negative: Patient sounds very sick or weak to the triager    Negative: Fever > 100.4 F (38.0 C)    Negative: Pain or burning with passing urine (urination)    Negative: MODERATE pain (e.g., interferes with normal activities or awakens from sleep)    Negative: Pain radiates into groin, scrotum    Negative: Blood in urine (red, pink, or tea-colored)    Negative: Pregnant   "(Exception: mild pain that is only present with movement)    Negative: Diabetes mellitus or weak immune system (e.g., HIV positive, cancer chemo, splenectomy, organ transplant, chronic steroids) (Exception: mild pain that is only present with movement)    Negative: Rash in same area as pain (may be described as \"small blisters\")    Negative: [1] MILD pain (i.e., scale 1-3; does not interfere with normal activities) AND [2] present > 3 days    Protocols used: FLANK PAIN-A-    Vangie Correa RN  [facility] Nurse Advisor  1/27/2022 at 9:02 PM      "

## 2022-01-31 ENCOUNTER — VIRTUAL VISIT (OUTPATIENT)
Dept: UROLOGY | Facility: CLINIC | Age: 47
End: 2022-01-31
Payer: MEDICARE

## 2022-01-31 ENCOUNTER — TELEPHONE (OUTPATIENT)
Dept: UROLOGY | Facility: CLINIC | Age: 47
End: 2022-01-31

## 2022-01-31 ENCOUNTER — PREP FOR PROCEDURE (OUTPATIENT)
Dept: UROLOGY | Facility: CLINIC | Age: 47
End: 2022-01-31

## 2022-01-31 ENCOUNTER — LAB (OUTPATIENT)
Dept: LAB | Facility: CLINIC | Age: 47
End: 2022-01-31
Payer: MEDICARE

## 2022-01-31 DIAGNOSIS — N17.9 ACUTE RENAL FAILURE (ARF) (H): ICD-10-CM

## 2022-01-31 DIAGNOSIS — N20.1 URETERAL STONE: Primary | ICD-10-CM

## 2022-01-31 DIAGNOSIS — N17.9 ACUTE RENAL FAILURE, UNSPECIFIED ACUTE RENAL FAILURE TYPE (H): ICD-10-CM

## 2022-01-31 LAB
ANION GAP SERPL CALCULATED.3IONS-SCNC: 5 MMOL/L (ref 3–14)
BUN SERPL-MCNC: 25 MG/DL (ref 7–30)
CALCIUM SERPL-MCNC: 9.4 MG/DL (ref 8.5–10.1)
CHLORIDE BLD-SCNC: 110 MMOL/L (ref 94–109)
CO2 SERPL-SCNC: 28 MMOL/L (ref 20–32)
CREAT SERPL-MCNC: 1.12 MG/DL (ref 0.52–1.04)
GFR SERPL CREATININE-BSD FRML MDRD: 61 ML/MIN/1.73M2
GLUCOSE BLD-MCNC: 106 MG/DL (ref 70–99)
POTASSIUM BLD-SCNC: 3.9 MMOL/L (ref 3.4–5.3)
SODIUM SERPL-SCNC: 143 MMOL/L (ref 133–144)

## 2022-01-31 PROCEDURE — 99213 OFFICE O/P EST LOW 20 MIN: CPT | Mod: 95 | Performed by: UROLOGY

## 2022-01-31 PROCEDURE — 36415 COLL VENOUS BLD VENIPUNCTURE: CPT

## 2022-01-31 PROCEDURE — 80048 BASIC METABOLIC PNL TOTAL CA: CPT

## 2022-01-31 NOTE — PROGRESS NOTES
Yaima is a 46 year old who is being evaluated via a billable video visit.      How would you like to obtain your AVS? MyChart  If the video visit is dropped, the invitation should be resent by: Text to cell phone:    Will anyone else be joining your video visit? No      Video Start Time: 1045    Assessment & Plan   Problem List Items Addressed This Visit     None      Visit Diagnoses     Ureteral stone    -  Primary           Review of the result(s) of each unique test - creatinine  20 minutes spent on the date of the encounter doing chart review, history and exam, documentation and further activities per the note       See Patient Instructions    No follow-ups on file.    Aamir Fontanez MD  [facility] REY Erwin is a 46 year old who presents for the following health issues AFR/Ureteral stone    HPI     46-year-old  female with history of acute renal failure due to an obstructed ureteral stone on the right kidney.  She has a solitary kidney.  Recent creatinine was 1.12.  She has some stent symptoms.    Review of Systems   Constitutional, HEENT, cardiovascular, pulmonary, gi and gu systems are negative, except as otherwise noted.      Objective           Vitals:  No vitals were obtained today due to virtual visit.    Physical Exam   GENERAL: Healthy, alert and no distress  EYES: Eyes grossly normal to inspection.  No discharge or erythema, or obvious scleral/conjunctival abnormalities.  RESP: No audible wheeze, cough, or visible cyanosis.  No visible retractions or increased work of breathing.    SKIN: Visible skin clear. No significant rash, abnormal pigmentation or lesions.  NEURO: Cranial nerves grossly intact.  Mentation and speech appropriate for age.  PSYCH: Mentation appears normal, affect normal/bright, judgement and insight intact, normal speech and appearance well-groomed.    I discussed different treatment options including watchful waiting, ESWL and  ureteroscopy. Given the location of the stone I think ureteroscopy with possible holmium laser treatment is the best treatment option.    I discussed the benefits and risks including the risks of infection, bleeding, failure to access the ureter, damage to the ureter with subsequent stricture formation, need for additional procedures and the possible need for a postoperative ureteral stent. I told the patient that in some circumstances we are unable to advance the ureteroscope to access the kidney. In those instances I usually recommend simply placing a ureteral stent with plans to return to the operating room in several weeks for a repeat attempt at ureteroscopy.    The patient was advised that if a post-op stent is placed a subsequent cystoscopy may be required for stent removal. The patient was given an opportunity to ask questions about the planned procedure and wishes to proceed.    Risks of anesthesia bleeding, infection, irritative voiding symptoms, ureteral perforation and the need for prolonged stenting and a percutaneous nephrostomy tube or nephrectomy were thoroughly discussed.    Expected stone free rates, risk of second procedure, injury to the ureter, bladder or kidney and risk of urosepsis were discussed.    Will send patient info on ureteroscopy        Video-Visit Details    Type of service:  Video Visit    Video End Time:10:57 AM    Originating Location (pt. Location): Home    Distant Location (provider location):  [facility]     Platform used for Video Visit: [employer]

## 2022-01-31 NOTE — TELEPHONE ENCOUNTER
Type of surgery: CYSTOURETEROSCOPY, WITH LITHOTRIPSY USING LASER AND URETERAL STENT INSERTION  Location of surgery: [facility] OR  Date and time of surgery: 2/16  Surgeon: Estee  Pre-Op Appt Date: [facility]  Post-Op Appt Date: urology will schedule   Packet sent out: Yes  Pre-cert/Authorization completed:  Not Applicable  Date: na

## 2022-01-31 NOTE — PATIENT INSTRUCTIONS
Patient Education     Ureteroscopy  What You Should Know  What is ureteroscopy?  Ureteroscopy uses a long, thin tube called a ureteroscope to clear stones from anywhere in the ureter (the tube that connects the kidney to the bladder), kidney or both. The ureteroscope is like a telescope, with an eyepiece on one end and a tiny lens on the other end.  How do I get ready?  Please bring an adult who can drive you home after your exam. We will give you medicine to sedate you (help you relax). You will not be able to drive for 12 hours.  Ask your doctor if there is anything else you should do to prepare.  What happens during the surgery?  We will place the ureteroscope into your urethra (the tube that leads from your bladder out of your body). We watch through the eyepiece as we carefully guide the scope to the stone(s). Surgery usually takes 30 to 60 minutes.  Whenever possible, we remove stones in one piece. We may break up larger stones with a laser before removing. We try to remove all stones and stone fragments in a single treatment.   We may also place a stent after we clear any stones. This is a soft, plastic tube that helps urine drain into the bladder. Most patients have the stent for 2 to 14 days, and we remove it at your post-op visit.  What are the risks?    Infection. We will give you antibiotics before surgery to help prevent new infections. But infections can still happen.    Injury. Surgery may cause scarring or strictures (narrowing of ureter), which may need other surgeries to correct. This is rare and most likely to happen if the ureter was very inflamed (swollen) before surgery or if a stone is very tightly impacted (unable to move or lodged in the wall of the ureter). During surgery, if the surgeon becomes concerned you are at risk for this, they will stop surgery.    Inaccessible stones. Almost all patients only need one surgery. But if your ureter is very narrow or your kidney stone is very  impacted, we will stop the surgery and place a stent. While you have the stent, your ureter will relax and you can safely have surgery again 1 to 2 weeks later.    Not all stones are cleared. Our goal is to remove all stones and fragments. But sometimes for different reasons, stones or stone fragments still remain after surgery. These may pass on their own, which may cause discomfort.  What should I expect after surgery?  You may have some discomfort after surgery. You may also have:    The need to pee suddenly or often    Pain when you pee    A dull backache, which may get worse when you pee    Blood in your pee (color of fruit punch) and some clots, which may increase with physical activity  Diet    Try to eat smaller, more frequent snacks, instead of large meals.    Drink a little more fluids than usual. You don't need to greatly increase your fluid intake. Doing so may increase nausea (feeling sick to your stomach) symptoms.  Activity  Many people return to work within 1 to 2 days. Fatigue is normal for a couple of weeks after surgery. The more active you are, the more discomfort you may feel. You may also notice more blood in your urine. Decrease activity to decrease pain if needed.   When should I call my doctor?  Call us right away if you have:    Fever higher than 101 F (38 C), taken under the tongue    Chills    Pain not controlled by pain medicines    Heavy bleeding or large clots in urine    Frequent nausea or vomiting (throwing up)  Follow-up visits    After surgery, we'll send the stones to a lab to see what they are made of.    We may also schedule you for a CT scan or other tests. These tests are important to confirm all stones and fragments were removed and there are no complications. The tests also help us plan how to prevent new stones from forming, as well as keep existing ones from growing.    It is very important to visit the doctor who did the surgery at their office about a month after  surgery. At this visit, we'll discuss all test results and make a plan to help you prevent future stone events. We may also remove the stent if you have one.  For informational purposes only. Not to replace the advice of your health care provider. Copyright   2019 [employer]. All rights reserved. Clinically reviewed by [facility]. [employer] [id number] - REV 07/19.

## 2022-02-02 ENCOUNTER — HOSPITAL ENCOUNTER (EMERGENCY)
Facility: CLINIC | Age: 47
Discharge: HOME | End: 2022-02-02
Attending: FAMILY MEDICINE | Admitting: FAMILY MEDICINE
Payer: MEDICARE

## 2022-02-02 ENCOUNTER — APPOINTMENT (OUTPATIENT)
Dept: GENERAL RADIOLOGY | Facility: CLINIC | Age: 47
End: 2022-02-02
Attending: FAMILY MEDICINE
Payer: MEDICARE

## 2022-02-02 VITALS
TEMPERATURE: 98.4 F | DIASTOLIC BLOOD PRESSURE: 80 MMHG | HEART RATE: 71 BPM | SYSTOLIC BLOOD PRESSURE: 128 MMHG | WEIGHT: 277 LBS | RESPIRATION RATE: 18 BRPM

## 2022-02-02 DIAGNOSIS — S93.431A SPRAIN OF TIBIOFIBULAR LIGAMENT OF RIGHT ANKLE, INITIAL ENCOUNTER: ICD-10-CM

## 2022-02-02 PROCEDURE — 99282 EMERGENCY DEPT VISIT SF MDM: CPT | Performed by: FAMILY MEDICINE

## 2022-02-02 PROCEDURE — 99283 EMERGENCY DEPT VISIT LOW MDM: CPT | Performed by: FAMILY MEDICINE

## 2022-02-02 PROCEDURE — 73610 X-RAY EXAM OF ANKLE: CPT | Mod: RT

## 2022-02-02 ASSESSMENT — ENCOUNTER SYMPTOMS
CHILLS: 0
JOINT SWELLING: 1
ARTHRALGIAS: 1
FEVER: 0

## 2022-02-02 NOTE — ED PROVIDER NOTES
History     Chief Complaint   Patient presents with     Ankle Pain     HPI  Yaima Maxwell is a 46 year old female who presented to the emergency room secondary concerns of injury to her right ankle.  Patient states that she was sitting on the toilet for about 10 minutes and when she got up her right leg was asleep which caused her to stumble and twist the right ankle.  She had immediate pain to the lateral right ankle with significant swelling.  She has had difficulty bearing weight on the right ankle due to the pain since the episode occurred about an hour ago.  She denies any other injury associated with the episode.  Specifically she denies any pain to the knee area or the right foot except to the area around the ankle.  Patient states that she underwent a procedure yesterday to place a stent in her right ureter because of a kidney stone present in the ureter.  Patient stated that she took 2 oxycodone pain medication tablets after the ankle injury.    Allergies:  Allergies   Allergen Reactions     Imitrex [Sumatriptan] Anaphylaxis       Problem List:    There are no problems to display for this patient.       Past Medical History:    History reviewed. No pertinent past medical history.    Past Surgical History:    History reviewed. No pertinent surgical history.    Family History:    History reviewed. No pertinent family history.    Social History:  Marital Status:   [2]  Social History     Tobacco Use     Smoking status: Never Smoker     Smokeless tobacco: Never Used   Substance Use Topics     Alcohol use: None     Drug use: None        Medications:    amitriptyline (ELAVIL) 25 MG tablet  cholecalciferol 50 MCG (2000 UT) CAPS  Cranberry 400 MG CAPS  cyanocobalamin 1000 MCG SUBL  docusate sodium (DSS) 100 MG capsule  famotidine (PEPCID) 20 MG tablet  gabapentin (NEURONTIN) 300 MG capsule  metoprolol succinate ER (TOPROL-XL) 100 MG 24 hr tablet  multivitamin w/minerals (CENTRUM ADULTS)  tablet  norethindrone (MICRONOR) 0.35 MG tablet  nystatin (MYCOSTATIN) 658246 UNIT/GM external ointment  omeprazole (PRILOSEC) 20 MG DR capsule  ondansetron (ZOFRAN-ODT) 4 MG ODT tab  pyridOXINE (VITAMIN B-6) 50 MG tablet  Rimegepant Sulfate 75 MG TBDP          Review of Systems   Constitutional: Negative for chills and fever.   Musculoskeletal: Positive for arthralgias (right lateral ankle) and joint swelling (right ankle).   All other systems reviewed and are negative.      Physical Exam   BP: 128/80  Pulse: 71  Temp: 98.4  F (36.9  C)  Resp: 18  Weight: 125.6 kg (277 lb)      Physical Exam  Vitals and nursing note reviewed.   Constitutional:       General: She is in acute distress.   HENT:      Head: Normocephalic and atraumatic.   Musculoskeletal:         General: Swelling (right lateal ankle) and tenderness (right ankle) present. No deformity.      Right ankle: Swelling present. No deformity or lacerations. Tenderness present over the lateral malleolus. No medial malleolus or proximal fibula tenderness. Anterior drawer test negative. Normal pulse.      Right Achilles Tendon: Normal.        Legs:    Skin:     Capillary Refill: Capillary refill takes less than 2 seconds.   Neurological:      Mental Status: She is alert and oriented to person, place, and time.   Psychiatric:         Mood and Affect: Mood normal.         Behavior: Behavior normal.         ED Course                 Procedures              Critical Care time:  none               Results for orders placed or performed during the hospital encounter of 02/02/22 (from the past 24 hour(s))   XR Ankle Right G/E 3 Views    Narrative    EXAM: XR ANKLE RIGHT G/E 3 VIEWS  LOCATION: [facility]  DATE/TIME: 2/2/2022 12:47 AM    INDICATION: Fall, lateral swelling, pain  COMPARISON: None.      Impression    IMPRESSION: No acute fracture or dislocation. Lateral soft tissue swelling.           Assessments & Plan (with Medical Decision  Making)  Patient with history and exam findings consistent with right ankle sprain.  Talofibular ligamentous injury suspected based on exam.  X-ray examination reassuring.  No ligamentous laxity noted on exam.  Significant swelling noted in the patient had ice placed on the area through the ER stay.  Patient will be treated with a Ace wrap and stirrup splint.  Patient was also given crutches and instructed appropriate use of the crutches.  Patient was given verbal as well as written instructions on the use of the Ace wrap and stirrup splint and crutches.  Encourage follow-up for recheck in her clinic in 10 to 14 days.  To return the ER for increase or worsening symptoms if needed.       I have reviewed the nursing notes.    I have reviewed the findings, diagnosis, plan and need for follow up with the patient.       Final diagnoses:   Sprain of tibiofibular ligament of right ankle, initial encounter       2/2/2022   [facility] EMERGENCY DEPT     Callum Arnold,   02/02/22 0130

## 2022-02-03 DIAGNOSIS — Z11.59 ENCOUNTER FOR SCREENING FOR OTHER VIRAL DISEASES: Primary | ICD-10-CM

## 2022-02-08 ENCOUNTER — TRANSFERRED RECORDS (OUTPATIENT)
Dept: HEALTH INFORMATION MANAGEMENT | Facility: CLINIC | Age: 47
End: 2022-02-08
Payer: MEDICARE

## 2022-02-12 ENCOUNTER — LAB (OUTPATIENT)
Dept: LAB | Facility: CLINIC | Age: 47
End: 2022-02-12
Payer: MEDICARE

## 2022-02-12 DIAGNOSIS — Z11.59 ENCOUNTER FOR SCREENING FOR OTHER VIRAL DISEASES: ICD-10-CM

## 2022-02-12 PROCEDURE — U0003 INFECTIOUS AGENT DETECTION BY NUCLEIC ACID (DNA OR RNA); SEVERE ACUTE RESPIRATORY SYNDROME CORONAVIRUS 2 (SARS-COV-2) (CORONAVIRUS DISEASE [COVID-19]), AMPLIFIED PROBE TECHNIQUE, MAKING USE OF HIGH THROUGHPUT TECHNOLOGIES AS DESCRIBED BY CMS-2020-01-R: HCPCS

## 2022-02-12 PROCEDURE — U0005 INFEC AGEN DETEC AMPLI PROBE: HCPCS

## 2022-02-13 LAB — SARS-COV-2 RNA RESP QL NAA+PROBE: NEGATIVE

## 2022-02-16 ENCOUNTER — HOSPITAL ENCOUNTER (OUTPATIENT)
Facility: CLINIC | Age: 47
Discharge: HOME | End: 2022-02-16
Attending: UROLOGY | Admitting: UROLOGY
Payer: MEDICARE

## 2022-02-16 ENCOUNTER — APPOINTMENT (OUTPATIENT)
Dept: GENERAL RADIOLOGY | Facility: CLINIC | Age: 47
End: 2022-02-16
Attending: INTERNAL MEDICINE
Payer: MEDICARE

## 2022-02-16 ENCOUNTER — HOSPITAL ENCOUNTER (OUTPATIENT)
Dept: GENERAL RADIOLOGY | Facility: CLINIC | Age: 47
End: 2022-02-16
Attending: UROLOGY | Admitting: UROLOGY
Payer: MEDICARE

## 2022-02-16 ENCOUNTER — ANESTHESIA EVENT (OUTPATIENT)
Dept: SURGERY | Facility: CLINIC | Age: 47
End: 2022-02-16
Payer: MEDICARE

## 2022-02-16 ENCOUNTER — ANESTHESIA (OUTPATIENT)
Dept: SURGERY | Facility: CLINIC | Age: 47
End: 2022-02-16
Payer: MEDICARE

## 2022-02-16 VITALS
DIASTOLIC BLOOD PRESSURE: 83 MMHG | TEMPERATURE: 97.3 F | BODY MASS INDEX: 39.65 KG/M2 | SYSTOLIC BLOOD PRESSURE: 132 MMHG | OXYGEN SATURATION: 93 % | RESPIRATION RATE: 10 BRPM | WEIGHT: 277 LBS | HEART RATE: 78 BPM | HEIGHT: 70 IN

## 2022-02-16 DIAGNOSIS — N20.0 RENAL STONE: Primary | ICD-10-CM

## 2022-02-16 DIAGNOSIS — S93.401D SPRAIN OF LIGAMENT OF RIGHT ANKLE, SUBSEQUENT ENCOUNTER: ICD-10-CM

## 2022-02-16 DIAGNOSIS — N20.1 URETERAL STONE: ICD-10-CM

## 2022-02-16 PROCEDURE — 272N000001 HC OR GENERAL SUPPLY STERILE: Performed by: UROLOGY

## 2022-02-16 PROCEDURE — C1769 GUIDE WIRE: HCPCS | Performed by: UROLOGY

## 2022-02-16 PROCEDURE — 250N000009 HC RX 250: Performed by: NURSE ANESTHETIST, CERTIFIED REGISTERED

## 2022-02-16 PROCEDURE — 999N000141 HC STATISTIC PRE-PROCEDURE NURSING ASSESSMENT: Performed by: UROLOGY

## 2022-02-16 PROCEDURE — 250N000013 HC RX MED GY IP 250 OP 250 PS 637: Performed by: UROLOGY

## 2022-02-16 PROCEDURE — 250N000011 HC RX IP 250 OP 636: Performed by: NURSE ANESTHETIST, CERTIFIED REGISTERED

## 2022-02-16 PROCEDURE — C1894 INTRO/SHEATH, NON-LASER: HCPCS | Performed by: UROLOGY

## 2022-02-16 PROCEDURE — 73610 X-RAY EXAM OF ANKLE: CPT | Mod: RT

## 2022-02-16 PROCEDURE — 250N000026 HC DESFLURANE, PER MIN: Performed by: UROLOGY

## 2022-02-16 PROCEDURE — 74420 UROGRAPHY RTRGR +-KUB: CPT | Mod: 26 | Performed by: UROLOGY

## 2022-02-16 PROCEDURE — 999N000179 XR SURGERY CARM FLUORO LESS THAN 5 MIN W STILLS

## 2022-02-16 PROCEDURE — 52356 CYSTO/URETERO W/LITHOTRIPSY: CPT | Mod: RT | Performed by: UROLOGY

## 2022-02-16 PROCEDURE — 710N000012 HC RECOVERY PHASE 2, PER MINUTE: Performed by: UROLOGY

## 2022-02-16 PROCEDURE — 250N000025 HC SEVOFLURANE, PER MIN: Performed by: UROLOGY

## 2022-02-16 PROCEDURE — 710N000010 HC RECOVERY PHASE 1, LEVEL 2, PER MIN: Performed by: UROLOGY

## 2022-02-16 PROCEDURE — C2617 STENT, NON-COR, TEM W/O DEL: HCPCS | Performed by: UROLOGY

## 2022-02-16 PROCEDURE — 250N000011 HC RX IP 250 OP 636: Performed by: UROLOGY

## 2022-02-16 PROCEDURE — 250N000013 HC RX MED GY IP 250 OP 250 PS 637: Performed by: NURSE ANESTHETIST, CERTIFIED REGISTERED

## 2022-02-16 PROCEDURE — C1758 CATHETER, URETERAL: HCPCS | Performed by: UROLOGY

## 2022-02-16 PROCEDURE — 82365 CALCULUS SPECTROSCOPY: CPT | Performed by: UROLOGY

## 2022-02-16 PROCEDURE — 370N000017 HC ANESTHESIA TECHNICAL FEE, PER MIN: Performed by: UROLOGY

## 2022-02-16 PROCEDURE — 258N000003 HC RX IP 258 OP 636: Performed by: NURSE ANESTHETIST, CERTIFIED REGISTERED

## 2022-02-16 PROCEDURE — 360N000083 HC SURGERY LEVEL 3 W/ FLUORO, PER MIN: Performed by: UROLOGY

## 2022-02-16 DEVICE — STENT URETERAL PERCUFLEX PLUS 7FRX28CM M0061752740: Type: IMPLANTABLE DEVICE | Site: URETHRA | Status: FUNCTIONAL

## 2022-02-16 RX ORDER — HYDRALAZINE HYDROCHLORIDE 20 MG/ML
2.5-5 INJECTION INTRAMUSCULAR; INTRAVENOUS EVERY 10 MIN PRN
Status: DISCONTINUED | OUTPATIENT
Start: 2022-02-16 | End: 2022-02-16 | Stop reason: HOSPADM

## 2022-02-16 RX ORDER — SODIUM CHLORIDE, SODIUM LACTATE, POTASSIUM CHLORIDE, CALCIUM CHLORIDE 600; 310; 30; 20 MG/100ML; MG/100ML; MG/100ML; MG/100ML
INJECTION, SOLUTION INTRAVENOUS CONTINUOUS
Status: DISCONTINUED | OUTPATIENT
Start: 2022-02-16 | End: 2022-02-16 | Stop reason: HOSPADM

## 2022-02-16 RX ORDER — FENTANYL CITRATE 50 UG/ML
INJECTION, SOLUTION INTRAMUSCULAR; INTRAVENOUS PRN
Status: DISCONTINUED | OUTPATIENT
Start: 2022-02-16 | End: 2022-02-16

## 2022-02-16 RX ORDER — DIMENHYDRINATE 50 MG/ML
INJECTION, SOLUTION INTRAMUSCULAR; INTRAVENOUS PRN
Status: DISCONTINUED | OUTPATIENT
Start: 2022-02-16 | End: 2022-02-16

## 2022-02-16 RX ORDER — LIDOCAINE 40 MG/G
CREAM TOPICAL
Status: DISCONTINUED | OUTPATIENT
Start: 2022-02-16 | End: 2022-02-16 | Stop reason: HOSPADM

## 2022-02-16 RX ORDER — CEFAZOLIN SODIUM IN 0.9 % NACL 3 G/100 ML
3 INTRAVENOUS SOLUTION, PIGGYBACK (ML) INTRAVENOUS
Status: COMPLETED | OUTPATIENT
Start: 2022-02-16 | End: 2022-02-16

## 2022-02-16 RX ORDER — MEPERIDINE HYDROCHLORIDE 25 MG/ML
12.5 INJECTION INTRAMUSCULAR; INTRAVENOUS; SUBCUTANEOUS
Status: DISCONTINUED | OUTPATIENT
Start: 2022-02-16 | End: 2022-02-16 | Stop reason: HOSPADM

## 2022-02-16 RX ORDER — METOPROLOL TARTRATE 1 MG/ML
1-2 INJECTION, SOLUTION INTRAVENOUS EVERY 5 MIN PRN
Status: DISCONTINUED | OUTPATIENT
Start: 2022-02-16 | End: 2022-02-16 | Stop reason: HOSPADM

## 2022-02-16 RX ORDER — CEFAZOLIN SODIUM IN 0.9 % NACL 3 G/100 ML
3 INTRAVENOUS SOLUTION, PIGGYBACK (ML) INTRAVENOUS SEE ADMIN INSTRUCTIONS
Status: DISCONTINUED | OUTPATIENT
Start: 2022-02-16 | End: 2022-02-16 | Stop reason: HOSPADM

## 2022-02-16 RX ORDER — HYDROCODONE BITARTRATE AND ACETAMINOPHEN 5; 325 MG/1; MG/1
1-2 TABLET ORAL EVERY 4 HOURS PRN
Qty: 15 TABLET | Refills: 0 | Status: SHIPPED | OUTPATIENT
Start: 2022-02-16 | End: 2022-05-25 | Stop reason: ALTCHOICE

## 2022-02-16 RX ORDER — ONDANSETRON 2 MG/ML
4 INJECTION INTRAMUSCULAR; INTRAVENOUS EVERY 30 MIN PRN
Status: DISCONTINUED | OUTPATIENT
Start: 2022-02-16 | End: 2022-02-16 | Stop reason: HOSPADM

## 2022-02-16 RX ORDER — ONDANSETRON 4 MG/1
4 TABLET, ORALLY DISINTEGRATING ORAL EVERY 30 MIN PRN
Status: DISCONTINUED | OUTPATIENT
Start: 2022-02-16 | End: 2022-02-16 | Stop reason: HOSPADM

## 2022-02-16 RX ORDER — ONDANSETRON 2 MG/ML
INJECTION INTRAMUSCULAR; INTRAVENOUS PRN
Status: DISCONTINUED | OUTPATIENT
Start: 2022-02-16 | End: 2022-02-16

## 2022-02-16 RX ORDER — FENTANYL CITRATE 50 UG/ML
25 INJECTION, SOLUTION INTRAMUSCULAR; INTRAVENOUS EVERY 5 MIN PRN
Status: DISCONTINUED | OUTPATIENT
Start: 2022-02-16 | End: 2022-02-16 | Stop reason: HOSPADM

## 2022-02-16 RX ORDER — CEPHALEXIN 500 MG/1
500 CAPSULE ORAL 3 TIMES DAILY
Qty: 9 CAPSULE | Refills: 0 | Status: SHIPPED | OUTPATIENT
Start: 2022-02-16 | End: 2022-02-19

## 2022-02-16 RX ORDER — PROPOFOL 10 MG/ML
INJECTION, EMULSION INTRAVENOUS PRN
Status: DISCONTINUED | OUTPATIENT
Start: 2022-02-16 | End: 2022-02-16

## 2022-02-16 RX ORDER — ALBUTEROL SULFATE 0.83 MG/ML
2.5 SOLUTION RESPIRATORY (INHALATION) EVERY 4 HOURS PRN
Status: DISCONTINUED | OUTPATIENT
Start: 2022-02-16 | End: 2022-02-16 | Stop reason: HOSPADM

## 2022-02-16 RX ORDER — PHENAZOPYRIDINE HYDROCHLORIDE 100 MG/1
100 TABLET, FILM COATED ORAL ONCE
Status: COMPLETED | OUTPATIENT
Start: 2022-02-16 | End: 2022-02-16

## 2022-02-16 RX ORDER — HYDROMORPHONE HYDROCHLORIDE 1 MG/ML
0.2 INJECTION, SOLUTION INTRAMUSCULAR; INTRAVENOUS; SUBCUTANEOUS EVERY 5 MIN PRN
Status: DISCONTINUED | OUTPATIENT
Start: 2022-02-16 | End: 2022-02-16 | Stop reason: HOSPADM

## 2022-02-16 RX ORDER — LIDOCAINE HYDROCHLORIDE 20 MG/ML
INJECTION, SOLUTION INFILTRATION; PERINEURAL PRN
Status: DISCONTINUED | OUTPATIENT
Start: 2022-02-16 | End: 2022-02-16

## 2022-02-16 RX ORDER — OXYCODONE HYDROCHLORIDE 5 MG/1
5 TABLET ORAL EVERY 4 HOURS PRN
Status: DISCONTINUED | OUTPATIENT
Start: 2022-02-16 | End: 2022-02-16 | Stop reason: HOSPADM

## 2022-02-16 RX ORDER — FENTANYL CITRATE 50 UG/ML
25 INJECTION, SOLUTION INTRAMUSCULAR; INTRAVENOUS
Status: DISCONTINUED | OUTPATIENT
Start: 2022-02-16 | End: 2022-02-16 | Stop reason: HOSPADM

## 2022-02-16 RX ADMIN — MIDAZOLAM 1 MG: 1 INJECTION INTRAMUSCULAR; INTRAVENOUS at 16:26

## 2022-02-16 RX ADMIN — PHENAZOPYRIDINE HYDROCHLORIDE 100 MG: 100 TABLET, FILM COATED ORAL at 17:58

## 2022-02-16 RX ADMIN — HYDROMORPHONE HYDROCHLORIDE 0.2 MG: 1 INJECTION, SOLUTION INTRAMUSCULAR; INTRAVENOUS; SUBCUTANEOUS at 16:45

## 2022-02-16 RX ADMIN — OXYCODONE HYDROCHLORIDE 5 MG: 5 TABLET ORAL at 17:23

## 2022-02-16 RX ADMIN — FENTANYL CITRATE 25 MCG: 50 INJECTION INTRAMUSCULAR; INTRAVENOUS at 16:17

## 2022-02-16 RX ADMIN — Medication 3 G: at 14:23

## 2022-02-16 RX ADMIN — FENTANYL CITRATE 50 MCG: 50 INJECTION, SOLUTION INTRAMUSCULAR; INTRAVENOUS at 14:13

## 2022-02-16 RX ADMIN — SODIUM CHLORIDE, POTASSIUM CHLORIDE, SODIUM LACTATE AND CALCIUM CHLORIDE: 600; 310; 30; 20 INJECTION, SOLUTION INTRAVENOUS at 13:26

## 2022-02-16 RX ADMIN — ROCURONIUM BROMIDE 25 MG: 50 INJECTION, SOLUTION INTRAVENOUS at 15:43

## 2022-02-16 RX ADMIN — Medication 20 MCG: at 15:45

## 2022-02-16 RX ADMIN — ONDANSETRON 4 MG: 2 INJECTION INTRAMUSCULAR; INTRAVENOUS at 14:52

## 2022-02-16 RX ADMIN — MIDAZOLAM 2 MG: 1 INJECTION INTRAMUSCULAR; INTRAVENOUS at 14:13

## 2022-02-16 RX ADMIN — FENTANYL CITRATE 25 MCG: 50 INJECTION INTRAMUSCULAR; INTRAVENOUS at 16:25

## 2022-02-16 RX ADMIN — SUGAMMADEX 200 MG: 100 INJECTION, SOLUTION INTRAVENOUS at 15:48

## 2022-02-16 RX ADMIN — LIDOCAINE HYDROCHLORIDE 60 MG: 20 INJECTION, SOLUTION INFILTRATION; PERINEURAL at 14:22

## 2022-02-16 RX ADMIN — PROPOFOL 200 MG: 10 INJECTION, EMULSION INTRAVENOUS at 14:23

## 2022-02-16 RX ADMIN — DIMENHYDRINATE 25 MG: 50 INJECTION, SOLUTION INTRAMUSCULAR; INTRAVENOUS at 14:29

## 2022-02-16 RX ADMIN — FENTANYL CITRATE 50 MCG: 50 INJECTION, SOLUTION INTRAMUSCULAR; INTRAVENOUS at 14:42

## 2022-02-16 RX ADMIN — ROCURONIUM BROMIDE 50 MG: 50 INJECTION, SOLUTION INTRAVENOUS at 14:23

## 2022-02-16 RX ADMIN — LIDOCAINE HYDROCHLORIDE 1 ML: 10 INJECTION, SOLUTION EPIDURAL; INFILTRATION; INTRACAUDAL; PERINEURAL at 13:25

## 2022-02-16 ASSESSMENT — LIFESTYLE VARIABLES: TOBACCO_USE: 0

## 2022-02-16 NOTE — BRIEF OP NOTE
[facility]    Brief Operative Note    Pre-operative diagnosis: Ureteral stone [N20.1]  Post-operative diagnosis right renal stone    Procedure: Procedure(s):  CYSTOURETEROSCOPY, WITH LITHOTRIPSY USING LASER AND URETERAL STENT INSERTION  Surgeon: Surgeon(s) and Role:     * Aamir Fontanez MD - Primary  Anesthesia: General   Estimated Blood Loss: Minimal    Drains: None  Specimens:   ID Type Source Tests Collected by Time Destination   A : Ureteral Stone Calculus/Stone Ureter, Right STONE ANALYSIS Aamir Fontanez MD 2/16/2022  3:17 PM      Findings:   None.  Complications: None.  Implants:   Implant Name Type Inv. Item Serial No.  Lot No. LRB No. Used Action   STENT URETERAL PERCUFLEX PLUS [id number] - [id number] Stent STENT URETERAL PERCUFLEX PLUS [id number]  [employer] [id number] Right 1 Implanted

## 2022-02-16 NOTE — OP NOTE
Preop diagnosis: Right ureteral stone in a solitary kidney    Postop diagnosis: Right renal stone    Procedure done:  #1 right ureteroscopy with holmium laser lithotripsy  #2 right ureteroscopy with stone basketing  #3 cystoscopy and retrograde pyelogram  #4 cystoscopy and right stent exchange    Procedure    Patient brought into the operating room and placed in a dorsolithotomy station after general anesthesia has been induced.  She was draped and prepped in the usual surgical fashion.  Patient received preop antibiotics.  A rigid cystoscope was placed into the bladder under direct vision.  The ureteral stent that was placed previously identified.  Using an alligator grasper this was brought out to the urethral opening.  A sensor wire was introduced into it.  This was placed into the renal pelvis under fluoroscopy.  A double-lumen cath was then used.  This is followed by placement of a Super Stiff wire.  A 12 [place] x14 ureteral access sheath was then used.  This was placed through the Super Stiff wire and nephroscopy into the upper ureter.  A flexible ureteroscope was then placed.  Retrograde pyelogram was done to locate stone.  The stone was identified in the lower pole of the kidney.  It was at a very acute angle.  Using holmium laser fiber I broke the stone into multiple small pieces.  About 90% of the stone was able to be broken down.  There is a small piece that was inaccessible with flexible ureteroscope.  Some stone fragments were removed with a tipless basket.  Afterwards a 7 [place] by 28 cm ureteral stent placed.  Patient tolerated procedure well.  No complications identified during procedure.  There was minimal bleeding during the operation.

## 2022-02-16 NOTE — ANESTHESIA PREPROCEDURE EVALUATION
Anesthesia Pre-Procedure Evaluation    Patient: Yaima Maxwell   MRN: [id number] : 1975        Preoperative Diagnosis: Ureteral stone [N20.1]    Procedure : Procedure(s):  CYSTOURETEROSCOPY, WITH LITHOTRIPSY USING LASER AND URETERAL STENT INSERTION          No past medical history on file.   No past surgical history on file.   Allergies   Allergen Reactions     Imitrex [Sumatriptan] Anaphylaxis      Social History     Tobacco Use     Smoking status: Never Smoker     Smokeless tobacco: Never Used   Substance Use Topics     Alcohol use: Not on file      Wt Readings from Last 1 Encounters:   22 125.6 kg (277 lb)        Anesthesia Evaluation   Pt has had prior anesthetic. Type: General.    History of anesthetic complications   pt states she has had psuedo sezure actrivity after anesthesia before..    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    (+) Nephrolithiasis ,     Endo:     (+) Obesity,     Psychiatric/Substance Use:  - neg psychiatric ROS     Infectious Disease:  - neg infectious disease ROS     Malignancy:  - neg malignancy ROS     Other:  - neg other ROS          Physical Exam    Airway        Mallampati: II   TM distance: > 3 FB   Neck ROM: full   Mouth opening: > 3 cm    Respiratory Devices and Support         Dental  no notable dental history         Cardiovascular   cardiovascular exam normal       Rhythm and rate: regular and normal     Pulmonary   pulmonary exam normal        breath sounds clear to auscultation            OUTSIDE LABS:  CBC:   Lab Results   Component Value Date    WBC 11.1 (H) 01/25/2022    HGB 14.5 01/25/2022    HCT 44.3 01/25/2022     01/25/2022     BMP:   Lab Results   Component Value Date     01/31/2022     01/26/2022    POTASSIUM 3.9 01/31/2022    POTASSIUM 4.1 01/26/2022    CHLORIDE 110 (H) 01/31/2022    CHLORIDE 107 01/26/2022    CO2 28 01/31/2022    CO2 25 01/26/2022    BUN 25 01/31/2022    BUN 43 (H) 01/26/2022    CR 1.12 (H) 01/31/2022    CR 3.24 (H) 01/26/2022     (H) 01/31/2022     (H) 01/26/2022     COAGS: No results found for: PTT, INR, FIBR  POC: No results found for: BGM, HCG, HCGS  HEPATIC:   Lab Results   Component Value Date    ALBUMIN 2.9 (L) 01/25/2022    PROTTOTAL 6.9 01/25/2022    ALT 24 01/25/2022    AST 18 01/25/2022    ALKPHOS 78 01/25/2022    BILITOTAL 0.5 01/25/2022     OTHER:   Lab Results   Component Value Date    LACT 0.8 01/25/2022    WILLIAMS 9.4 01/31/2022    LIPASE 121 01/25/2022       Anesthesia Plan    ASA Status:  3   NPO Status:  NPO Appropriate    Anesthesia Type: General.     - Airway: LMA   Induction: Intravenous, Propofol.   Maintenance: Balanced.        Consents    Anesthesia Plan(s) and associated risks, benefits, and realistic alternatives discussed. Questions answered and patient/representative(s) expressed understanding.    - Discussed:     - Discussed with:  Patient      - Extended Intubation/Ventilatory Support Discussed: No.      - Patient is DNR/DNI Status: No    Use of blood products discussed: Yes.     - Discussed with: Patient.     - Consented: consented to blood products            Reason for refusal: other.     Postoperative Care    Pain management: IV analgesics, Oral pain medications.   PONV prophylaxis: Ondansetron (or other 5HT-3), Meclizine or Dimenhydrinate     Comments:    Other Comments: The risks and benefits of anesthesia, and the alternatives where applicable, have been discussed with the patient, and they wish  to proceed.    Pt has migraine pre-op.  She is also dizzy prior to any meds.  Will use CHRISTINA Freeman CRNA

## 2022-02-16 NOTE — ANESTHESIA PROCEDURE NOTES
Airway       Patient location during procedure: OR       Procedure Start/Stop Times: 2/16/2022 2:29 PM  Staff -        CRNA: Angel Borges APRN CRNA       Performed By: CRNA  Consent for Airway        Urgency: elective  Indications and Patient Condition       Indications for airway management: duane-procedural       Induction type:intravenous       Mask difficulty assessment: 1 - vent by mask    Final Airway Details       Final airway type: endotracheal airway       Successful airway: ETT - single  Endotracheal Airway Details        ETT size (mm): 6.5       Cuffed: yes       Successful intubation technique: direct laryngoscopy       DL Blade Type: Dan 2       Grade View of Cords: 1       Adjucts: stylet       Position: Right       Measured from: lips       Secured at (cm): 21       Bite block used: Oral Airway    Post intubation assessment        Placement verified by: capnometry, equal breath sounds and chest rise        Number of attempts at approach: 1       Number of other approaches attempted: 0       Secured with: plastic tape       Ease of procedure: easy       Dentition: Intact and Unchanged

## 2022-02-17 NOTE — ANESTHESIA POSTPROCEDURE EVALUATION
"Patient: Yaima Maxwell    Procedure: Procedure(s):  CYSTOURETEROSCOPY, WITH LITHOTRIPSY USING LASER AND URETERAL STENT INSERTION       Diagnosis:Ureteral stone [N20.1]  Diagnosis Additional Information: No value filed.    Anesthesia Type:  General    Note:  Disposition: Outpatient   Postop Pain Control: Uneventful            Sign Out: Well controlled pain   PONV: No   Neuro/Psych: Uneventful            Sign Out: Acceptable/Baseline neuro status   Airway/Respiratory: Uneventful            Sign Out: Acceptable/Baseline resp. status   CV/Hemodynamics: Uneventful            Sign Out: Acceptable CV status   Other NRE: NONE   DID A NON-ROUTINE EVENT OCCUR? No    Event details/Postop Comments:  Pt was happy with anesthesia care.  She is complaining of a tolerable headache that she says \"I always get after anesthesia\" Her headache is decreasing with medications at this time.   Will follow up as necessary.            Last vitals:  Vitals Value Taken Time   /77 02/16/22 1705   Temp 97.52  F (36.4  C) 02/16/22 1708   Pulse 67 02/16/22 1706   Resp 14 02/16/22 1706   SpO2 99 % 02/16/22 1710   Vitals shown include unvalidated device data.    Electronically Signed By: CHRISTINA Mancia CRNA  February 16, 2022  6:29 PM  "

## 2022-02-21 LAB
APPEARANCE STONE: NORMAL
COMPN STONE: NORMAL
SPECIMEN WT: 9 MG

## 2022-03-02 ENCOUNTER — ANCILLARY PROCEDURE (OUTPATIENT)
Dept: GENERAL RADIOLOGY | Facility: CLINIC | Age: 47
End: 2022-03-02
Attending: UROLOGY
Payer: MEDICARE

## 2022-03-02 ENCOUNTER — PREP FOR PROCEDURE (OUTPATIENT)
Dept: UROLOGY | Facility: CLINIC | Age: 47
End: 2022-03-02

## 2022-03-02 ENCOUNTER — OFFICE VISIT (OUTPATIENT)
Dept: UROLOGY | Facility: CLINIC | Age: 47
End: 2022-03-02
Payer: MEDICARE

## 2022-03-02 DIAGNOSIS — N20.0 KIDNEY STONE: Primary | ICD-10-CM

## 2022-03-02 DIAGNOSIS — N20.1 URETERAL STONE: Primary | ICD-10-CM

## 2022-03-02 PROCEDURE — 74019 RADEX ABDOMEN 2 VIEWS: CPT | Mod: TC | Performed by: RADIOLOGY

## 2022-03-02 PROCEDURE — 52310 CYSTOSCOPY AND TREATMENT: CPT | Performed by: UROLOGY

## 2022-03-02 NOTE — PROGRESS NOTES
Patient returns to clinic for cysto and stent removal.  she is s/p ureteroscopy recently.  Due to location of stone,  I was unable to completely remove the stone.  KUB today shows small stone    Procedure: patient was brought to the cysto suite.  she was draped and prepped in the usual surgical fashion.  Cystoscopy placed. Stent removed without problems.    Recommendation:  Return to Clinic:      Schedule for elective ESWL in 1-2 months

## 2022-03-03 ENCOUNTER — TELEPHONE (OUTPATIENT)
Dept: UROLOGY | Facility: CLINIC | Age: 47
End: 2022-03-03
Payer: MEDICARE

## 2022-03-12 ENCOUNTER — HEALTH MAINTENANCE LETTER (OUTPATIENT)
Age: 47
End: 2022-03-12

## 2022-03-22 ENCOUNTER — LAB (OUTPATIENT)
Dept: LAB | Facility: OTHER | Age: 47
End: 2022-03-22
Attending: FAMILY MEDICINE
Payer: MEDICARE

## 2022-03-22 DIAGNOSIS — Z20.822 ENCOUNTER FOR LABORATORY TESTING FOR COVID-19 VIRUS: ICD-10-CM

## 2022-03-22 LAB — SARS-COV-2 RNA RESP QL NAA+PROBE: NEGATIVE

## 2022-03-22 PROCEDURE — U0005 INFEC AGEN DETEC AMPLI PROBE: HCPCS

## 2022-03-22 PROCEDURE — U0003 INFECTIOUS AGENT DETECTION BY NUCLEIC ACID (DNA OR RNA); SEVERE ACUTE RESPIRATORY SYNDROME CORONAVIRUS 2 (SARS-COV-2) (CORONAVIRUS DISEASE [COVID-19]), AMPLIFIED PROBE TECHNIQUE, MAKING USE OF HIGH THROUGHPUT TECHNOLOGIES AS DESCRIBED BY CMS-2020-01-R: HCPCS

## 2022-05-25 ENCOUNTER — HOSPITAL ENCOUNTER (EMERGENCY)
Facility: CLINIC | Age: 47
Discharge: HOME | End: 2022-05-25
Attending: PHYSICIAN ASSISTANT | Admitting: PHYSICIAN ASSISTANT
Payer: MEDICARE

## 2022-05-25 ENCOUNTER — APPOINTMENT (OUTPATIENT)
Dept: CT IMAGING | Facility: CLINIC | Age: 47
End: 2022-05-25
Attending: PHYSICIAN ASSISTANT
Payer: MEDICARE

## 2022-05-25 ENCOUNTER — APPOINTMENT (OUTPATIENT)
Dept: ULTRASOUND IMAGING | Facility: CLINIC | Age: 47
End: 2022-05-25
Attending: PHYSICIAN ASSISTANT
Payer: MEDICARE

## 2022-05-25 VITALS
DIASTOLIC BLOOD PRESSURE: 64 MMHG | OXYGEN SATURATION: 100 % | SYSTOLIC BLOOD PRESSURE: 123 MMHG | WEIGHT: 256 LBS | RESPIRATION RATE: 16 BRPM | HEART RATE: 69 BPM | BODY MASS INDEX: 36.73 KG/M2 | TEMPERATURE: 98.2 F

## 2022-05-25 DIAGNOSIS — K80.20 CHOLELITHIASIS: ICD-10-CM

## 2022-05-25 DIAGNOSIS — R07.89 CHEST WALL PAIN: ICD-10-CM

## 2022-05-25 LAB
ALBUMIN SERPL-MCNC: 3.3 G/DL (ref 3.4–5)
ALP SERPL-CCNC: 84 U/L (ref 40–150)
ALT SERPL W P-5'-P-CCNC: 33 U/L (ref 0–70)
ANION GAP SERPL CALCULATED.3IONS-SCNC: 4 MMOL/L (ref 3–14)
AST SERPL W P-5'-P-CCNC: 37 U/L (ref 0–45)
BASOPHILS # BLD AUTO: 0.1 10E3/UL (ref 0–0.2)
BASOPHILS NFR BLD AUTO: 1 %
BILIRUB SERPL-MCNC: 0.3 MG/DL (ref 0.2–1.3)
BUN SERPL-MCNC: 18 MG/DL (ref 7–30)
CALCIUM SERPL-MCNC: 8.6 MG/DL (ref 8.5–10.1)
CHLORIDE BLD-SCNC: 108 MMOL/L (ref 94–109)
CO2 SERPL-SCNC: 28 MMOL/L (ref 20–32)
CREAT SERPL-MCNC: 1.05 MG/DL (ref 0.52–1.25)
D DIMER PPP FEU-MCNC: 0.5 UG/ML FEU (ref 0–0.5)
EOSINOPHIL # BLD AUTO: 0.3 10E3/UL (ref 0–0.7)
EOSINOPHIL NFR BLD AUTO: 4 %
ERYTHROCYTE [DISTWIDTH] IN BLOOD BY AUTOMATED COUNT: 11.8 % (ref 10–15)
GFR SERPL CREATININE-BSD FRML MDRD: 66 ML/MIN/1.73M2
GLUCOSE BLD-MCNC: 105 MG/DL (ref 70–99)
HCT VFR BLD AUTO: 42.2 % (ref 35–53)
HGB BLD-MCNC: 14.4 G/DL (ref 11.7–17.7)
IMM GRANULOCYTES # BLD: 0 10E3/UL
IMM GRANULOCYTES NFR BLD: 0 %
LIPASE SERPL-CCNC: 228 U/L (ref 73–393)
LYMPHOCYTES # BLD AUTO: 2.8 10E3/UL (ref 0.8–5.3)
LYMPHOCYTES NFR BLD AUTO: 29 %
MCH RBC QN AUTO: 32.3 PG (ref 26.5–33)
MCHC RBC AUTO-ENTMCNC: 34.1 G/DL (ref 31.5–36.5)
MCV RBC AUTO: 95 FL (ref 78–100)
MONOCYTES # BLD AUTO: 0.9 10E3/UL (ref 0–1.3)
MONOCYTES NFR BLD AUTO: 9 %
NEUTROPHILS # BLD AUTO: 5.4 10E3/UL (ref 1.6–8.3)
NEUTROPHILS NFR BLD AUTO: 57 %
NRBC # BLD AUTO: 0 10E3/UL
NRBC BLD AUTO-RTO: 0 /100
PLATELET # BLD AUTO: 291 10E3/UL (ref 150–450)
POTASSIUM BLD-SCNC: 4.2 MMOL/L (ref 3.4–5.3)
PROT SERPL-MCNC: 7.1 G/DL (ref 6.8–8.8)
RBC # BLD AUTO: 4.46 10E6/UL (ref 3.8–5.9)
SODIUM SERPL-SCNC: 140 MMOL/L (ref 133–144)
TROPONIN I SERPL HS-MCNC: 4 NG/L
TROPONIN I SERPL HS-MCNC: 6 NG/L
WBC # BLD AUTO: 9.5 10E3/UL (ref 4–11)

## 2022-05-25 PROCEDURE — 36415 COLL VENOUS BLD VENIPUNCTURE: CPT | Performed by: PHYSICIAN ASSISTANT

## 2022-05-25 PROCEDURE — 96361 HYDRATE IV INFUSION ADD-ON: CPT | Performed by: PHYSICIAN ASSISTANT

## 2022-05-25 PROCEDURE — 85379 FIBRIN DEGRADATION QUANT: CPT | Performed by: PHYSICIAN ASSISTANT

## 2022-05-25 PROCEDURE — 250N000013 HC RX MED GY IP 250 OP 250 PS 637: Performed by: PHYSICIAN ASSISTANT

## 2022-05-25 PROCEDURE — G1004 CDSM NDSC: HCPCS

## 2022-05-25 PROCEDURE — 80053 COMPREHEN METABOLIC PANEL: CPT | Performed by: PHYSICIAN ASSISTANT

## 2022-05-25 PROCEDURE — 96374 THER/PROPH/DIAG INJ IV PUSH: CPT | Mod: 59 | Performed by: PHYSICIAN ASSISTANT

## 2022-05-25 PROCEDURE — 84484 ASSAY OF TROPONIN QUANT: CPT | Mod: 91 | Performed by: PHYSICIAN ASSISTANT

## 2022-05-25 PROCEDURE — 93010 ELECTROCARDIOGRAM REPORT: CPT | Performed by: PHYSICIAN ASSISTANT

## 2022-05-25 PROCEDURE — 71275 CT ANGIOGRAPHY CHEST: CPT | Mod: MG

## 2022-05-25 PROCEDURE — 99285 EMERGENCY DEPT VISIT HI MDM: CPT | Mod: 25 | Performed by: PHYSICIAN ASSISTANT

## 2022-05-25 PROCEDURE — 84484 ASSAY OF TROPONIN QUANT: CPT | Performed by: PHYSICIAN ASSISTANT

## 2022-05-25 PROCEDURE — 83690 ASSAY OF LIPASE: CPT | Performed by: PHYSICIAN ASSISTANT

## 2022-05-25 PROCEDURE — 76705 ECHO EXAM OF ABDOMEN: CPT

## 2022-05-25 PROCEDURE — 93005 ELECTROCARDIOGRAM TRACING: CPT | Performed by: PHYSICIAN ASSISTANT

## 2022-05-25 PROCEDURE — 258N000003 HC RX IP 258 OP 636: Performed by: PHYSICIAN ASSISTANT

## 2022-05-25 PROCEDURE — 250N000011 HC RX IP 250 OP 636: Performed by: PHYSICIAN ASSISTANT

## 2022-05-25 PROCEDURE — 250N000009 HC RX 250: Performed by: PHYSICIAN ASSISTANT

## 2022-05-25 PROCEDURE — 85014 HEMATOCRIT: CPT | Performed by: PHYSICIAN ASSISTANT

## 2022-05-25 RX ORDER — SODIUM CHLORIDE 9 MG/ML
INJECTION, SOLUTION INTRAVENOUS CONTINUOUS
Status: DISCONTINUED | OUTPATIENT
Start: 2022-05-25 | End: 2022-05-25 | Stop reason: HOSPADM

## 2022-05-25 RX ORDER — NITROGLYCERIN 0.4 MG/1
0.4 TABLET SUBLINGUAL EVERY 5 MIN PRN
Status: DISCONTINUED | OUTPATIENT
Start: 2022-05-25 | End: 2022-05-25 | Stop reason: HOSPADM

## 2022-05-25 RX ORDER — HYDROCODONE BITARTRATE AND ACETAMINOPHEN 5; 325 MG/1; MG/1
1 TABLET ORAL EVERY 6 HOURS PRN
Qty: 10 TABLET | Refills: 0 | Status: SHIPPED | OUTPATIENT
Start: 2022-05-25 | End: 2022-05-28

## 2022-05-25 RX ORDER — IOPAMIDOL 755 MG/ML
500 INJECTION, SOLUTION INTRAVASCULAR ONCE
Status: COMPLETED | OUTPATIENT
Start: 2022-05-25 | End: 2022-05-25

## 2022-05-25 RX ORDER — HYDROMORPHONE HYDROCHLORIDE 1 MG/ML
0.5 INJECTION, SOLUTION INTRAMUSCULAR; INTRAVENOUS; SUBCUTANEOUS
Status: DISCONTINUED | OUTPATIENT
Start: 2022-05-25 | End: 2022-05-25 | Stop reason: HOSPADM

## 2022-05-25 RX ORDER — ASPIRIN 81 MG/1
324 TABLET, CHEWABLE ORAL ONCE
Status: COMPLETED | OUTPATIENT
Start: 2022-05-25 | End: 2022-05-25

## 2022-05-25 RX ADMIN — SODIUM CHLORIDE 70 ML: 9 INJECTION, SOLUTION INTRAVENOUS at 13:57

## 2022-05-25 RX ADMIN — SODIUM CHLORIDE 1000 ML: 9 INJECTION, SOLUTION INTRAVENOUS at 14:10

## 2022-05-25 RX ADMIN — NITROGLYCERIN 0.4 MG: 0.4 TABLET SUBLINGUAL at 12:11

## 2022-05-25 RX ADMIN — IOPAMIDOL 75 ML: 755 INJECTION, SOLUTION INTRAVENOUS at 13:56

## 2022-05-25 RX ADMIN — HYDROMORPHONE HYDROCHLORIDE 0.5 MG: 1 INJECTION, SOLUTION INTRAMUSCULAR; INTRAVENOUS; SUBCUTANEOUS at 12:51

## 2022-05-25 RX ADMIN — ASPIRIN 81 MG 324 MG: 81 TABLET ORAL at 12:10

## 2022-05-25 NOTE — DISCHARGE INSTRUCTIONS
It was a pleasure working with you today!  I hope your condition improves rapidly!     Thankfully, all of your testing came back okay today in regards to your heart and lungs.  You did have multiple stones in your gallbladder, likely contributing to your overall symptoms today.  Your chest wall was tender to palpation more suggestive of chest wall discomfort.  I would avoid lifting over 5 pounds for the next couple days.  Try heat to the chest wall for 20 minutes every couple hours.  As we discussed, please avoid fatty foods to help prevent any gallbladder attacks.  Drink 10+ glasses of water daily.  Keep your appointment with your general surgeon to discuss gallstone treatment.  Use Tylenol 650 mg every 6 hours as needed for pain.  Utilize the hydrocodone for severe breakthrough pain.  Do not drive for 8 hours after taking hydrocodone, as it can impair your judgment.  Return to the ED if you experience fever, vomiting, and/or increasing abdominal pain.

## 2022-05-25 NOTE — ED PROVIDER NOTES
"  History     Chief Complaint   Patient presents with     Chest Pain     HPI  Yaima Maxwell is a 47 year old adult who presents for evaluation of left-sided chest pain for the past hour.  She was sitting and talking to a family member when symptoms started.  She was not doing anything exertional.  Denies any recent chest wall injury.  Pain is more in the left anterior chest and radiates through to her back.  She states it feels \"like a pressure \".  Describes it as a squeezing pain.  She feels diaphoretic, dizzy, and nausea.  No vomiting.  Rates her pain 7 on a scale of 10.  Denies any lower extremity edema or calf tenderness.  No recent cough, dyspnea, rhinorrhea, congestion, sore throat, or change in taste/smell sensation.  No recent fever or chills.  She does report having costochondritis in the past, but she feels like this is different.  She is treated for hypertension.  Denies hyperlipidemia or diabetes.  Family history is positive for CAD with her father first having issues at the age of 48 years old.  Patient is a non-smoker.  Has not taken any medication for her symptoms.          Allergies:  Allergies   Allergen Reactions     Imitrex [Sumatriptan] Anaphylaxis       Problem List:    There are no problems to display for this patient.       Past Medical History:    No past medical history on file.    Past Surgical History:    Past Surgical History:   Procedure Laterality Date     LASER HOLMIUM LITHOTRIPSY URETER(S), INSERT STENT, COMBINED Right 2/16/2022    Procedure: CYSTOURETEROSCOPY, WITH LITHOTRIPSY USING LASER AND URETERAL STENT INSERTION, RIGHT;  Surgeon: Aamir Fontanez MD;  Location: [facility]       Family History:    No family history on file.    Social History:  Marital Status:   [2]  Social History     Tobacco Use     Smoking status: Never Smoker     Smokeless tobacco: Never Used        Medications:    HYDROcodone-acetaminophen (NORCO) 5-325 MG tablet  amitriptyline (ELAVIL) 25 MG " tablet  cholecalciferol 50 MCG (2000 UT) CAPS  Cranberry 400 MG CAPS  cyanocobalamin 1000 MCG SUBL  docusate sodium (DSS) 100 MG capsule  famotidine (PEPCID) 20 MG tablet  gabapentin (NEURONTIN) 300 MG capsule  metoprolol succinate ER (TOPROL-XL) 100 MG 24 hr tablet  multivitamin w/minerals (CENTRUM ADULTS) tablet  norethindrone (MICRONOR) 0.35 MG tablet  nystatin (MYCOSTATIN) 241218 UNIT/GM external ointment  omeprazole (PRILOSEC) 20 MG DR capsule  ondansetron (ZOFRAN-ODT) 4 MG ODT tab  pyridOXINE (VITAMIN B-6) 50 MG tablet  Rimegepant Sulfate 75 MG TBDP          Review of Systems   All other systems reviewed and are negative.      Physical Exam   BP: (!) 145/78  Pulse: 57  Temp: 98.2  F (36.8  C)  Resp: 18  Weight: 116.1 kg (256 lb)  SpO2: 100 %      Physical Exam  Vitals reviewed.   Constitutional:       General: Yaima Maxwell is not in acute distress.     Appearance: Yaima Maxwell is not diaphoretic.   HENT:      Head: Normocephalic and atraumatic.      Right Ear: External ear normal.      Left Ear: External ear normal.      Nose: Nose normal.      Mouth/Throat:      Pharynx: No oropharyngeal exudate.   Eyes:      General: No scleral icterus.        Right eye: No discharge.         Left eye: No discharge.      Conjunctiva/sclera: Conjunctivae normal.      Pupils: Pupils are equal, round, and reactive to light.   Neck:      Thyroid: No thyromegaly.   Cardiovascular:      Rate and Rhythm: Normal rate and regular rhythm.      Heart sounds: Normal heart sounds. No murmur heard.  Pulmonary:      Effort: Pulmonary effort is normal. No respiratory distress.      Breath sounds: Normal breath sounds. No decreased breath sounds, wheezing or rales.   Chest:      Chest wall: Tenderness present. No mass, deformity, crepitus or edema. There is no dullness to percussion.   Abdominal:      General: Bowel sounds are normal. There is no distension.      Palpations: Abdomen is soft. There is no mass.      Tenderness: There is  abdominal tenderness (RUQ). There is no guarding or rebound.      Comments: Positive Ayala's sign   Musculoskeletal:         General: No tenderness or deformity. Normal range of motion.      Cervical back: Normal range of motion and neck supple.      Right lower leg: No tenderness. No edema.      Left lower leg: No tenderness. No edema.      Comments: Negative Elissa's sign   Lymphadenopathy:      Cervical: No cervical adenopathy.   Skin:     General: Skin is warm and dry.      Capillary Refill: Capillary refill takes less than 2 seconds.      Findings: No erythema or rash.   Neurological:      General: No focal deficit present.      Mental Status: Yaima Maxwell is alert and oriented to person, place, and time.      Cranial Nerves: No cranial nerve deficit.   Psychiatric:         Behavior: Behavior normal.         Thought Content: Thought content normal.         ED Course                 Procedures              EKG Interpretation:      Interpreted by Mohan Lange PA-C  Time reviewed: 1150  Symptoms at time of EKG: chest pain.   Rhythm: normal sinus   Rate: Sinus bradycardia at a rate of 54  Axis: normal  Ectopy: none  Conduction: normal  ST Segments/ T Waves: No ST-T wave changes  Q Waves: none  Comparison to prior: No old EKG available    Clinical Impression: normal EKG with sinus bradycardia at 54 bpm.  No acute ST or T wave change.        Critical Care time:  none               Results for orders placed or performed during the hospital encounter of 05/25/22 (from the past 24 hour(s))   CBC with platelets differential    Narrative    The following orders were created for panel order CBC with platelets differential.  Procedure                               Abnormality         Status                     ---------                               -----------         ------                     CBC with platelets and d...[[id number]]                      Final result                 Please view results for these  tests on the individual orders.   Troponin I   Result Value Ref Range    Troponin I High Sensitivity 4 <54 ng/L    Narrative    The sex of this patient cannot be reliably determined based on discrepancies in demographics (legal sex, sex assigned at birth, gender identity).  Both male and female reference ranges are provided where applicable.  Careful evaluation of the patient s results as compared to the gender specific reference intervals is required in this setting.    Comprehensive metabolic panel   Result Value Ref Range    Sodium 140 133 - 144 mmol/L    Potassium 4.2 3.4 - 5.3 mmol/L    Chloride 108 94 - 109 mmol/L    Carbon Dioxide (CO2) 28 20 - 32 mmol/L    Anion Gap 4 3 - 14 mmol/L    Urea Nitrogen 18 7 - 30 mg/dL    Creatinine 1.05 0.52 - 1.25 mg/dL    Calcium 8.6 8.5 - 10.1 mg/dL    Glucose 105 (H) 70 - 99 mg/dL    Alkaline Phosphatase 84 40 - 150 U/L    AST 37 0 - 45 U/L    ALT 33 0 - 70 U/L    Protein Total 7.1 6.8 - 8.8 g/dL    Albumin 3.3 (L) 3.4 - 5.0 g/dL    Bilirubin Total 0.3 0.2 - 1.3 mg/dL    GFR Estimate 66 >60 mL/min/1.73m2    Narrative    The sex of this patient cannot be reliably determined based on discrepancies in demographics (legal sex, sex assigned at birth, gender identity).  Both male and female reference ranges are provided where applicable.  Careful evaluation of the patient s results as compared to the gender specific reference intervals is required in this setting.    Lipase   Result Value Ref Range    Lipase 228 73 - 393 U/L   D dimer quantitative   Result Value Ref Range    D-Dimer Quantitative 0.50 0.00 - 0.50 ug/mL FEU    Narrative    This D-dimer assay is intended for use in conjunction with a clinical pretest probability assessment model to exclude pulmonary embolism (PE) and deep venous thrombosis (DVT) in outpatients suspected of PE or DVT. The cut-off value is 0.50 ug/mL FEU.   CBC with platelets and differential   Result Value Ref Range    WBC Count 9.5 4.0 - 11.0 10e3/uL     RBC Count 4.46 3.80 - 5.90 10e6/uL    Hemoglobin 14.4 11.7 - 17.7 g/dL    Hematocrit 42.2 35.0 - 53.0 %    MCV 95 78 - 100 fL    MCH 32.3 26.5 - 33.0 pg    MCHC 34.1 31.5 - 36.5 g/dL    RDW 11.8 10.0 - 15.0 %    Platelet Count 291 150 - 450 10e3/uL    % Neutrophils 57 %    % Lymphocytes 29 %    % Monocytes 9 %    % Eosinophils 4 %    % Basophils 1 %    % Immature Granulocytes 0 %    NRBCs per 100 WBC 0 <1 /100    Absolute Neutrophils 5.4 1.6 - 8.3 10e3/uL    Absolute Lymphocytes 2.8 0.8 - 5.3 10e3/uL    Absolute Monocytes 0.9 0.0 - 1.3 10e3/uL    Absolute Eosinophils 0.3 0.0 - 0.7 10e3/uL    Absolute Basophils 0.1 0.0 - 0.2 10e3/uL    Absolute Immature Granulocytes 0.0 <=0.4 10e3/uL    Absolute NRBCs 0.0 10e3/uL    Narrative    The sex of this patient cannot be reliably determined based on discrepancies in demographics (legal sex, sex assigned at birth, gender identity).  Both male and female reference ranges are provided where applicable.  Careful evaluation of the patient s results as compared to the gender specific reference intervals is required in this setting.    US Abdomen Limited (RUQ)    Narrative    US ABDOMEN LIMITED   5/25/2022 12:48 PM     HISTORY: RUQ abdominal pain.    COMPARISON: CT abdomen and pelvis on 1/25/2022.    FINDINGS:    Gallbladder: Multiple shadowing small gallstones. No gallbladder wall  thickening or pericholecystic fluid. Sonographic Ayala sign is  negative.    Bile ducts:  CHD is normal diameter.  No intrahepatic biliary  dilatation. The distal aspect of the common bile duct is obscured by  overlying bowel gas.    Liver: It demonstrates normal parenchymal echogenicity. No focal  hepatic mass is visualized.    Pancreas:  Partially obscured by overlying bowel gas,  but grossly  unremarkable.     Right kidney:  No hydronephrosis or shadowing calculi.    Aorta and IVC:  Not specifically assessed.       Impression    IMPRESSION:  Cholelithiasis without sonographic evidence of  acute  cholecystitis.    LIANA VILLAREAL MD         SYSTEM ID:  [id number]   CT Chest Pulmonary Embolism w Contrast    Narrative    CT CHEST PULMONARY EMBOLISM W CONTRAST 5/25/2022 2:03 PM    CLINICAL HISTORY: left sided chest pain, borderline D dimer, and on  OCP's  TECHNIQUE: CT angiogram chest during arterial phase injection IV  contrast. 2D and 3D MIP reconstructions were performed by the CT  technologist. Dose reduction techniques were used.     CONTRAST: ISOVUE-370, 75mL    COMPARISON: None.    FINDINGS:  ANGIOGRAM CHEST: Pulmonary arteries are normal caliber and negative  for pulmonary emboli. Thoracic aorta is negative for dissection. No CT  evidence of right heart strain.    LUNGS AND PLEURA: The lungs are clear. No pleural effusion.    MEDIASTINUM/AXILLAE: No lymphadenopathy. No thoracic aortic aneurysm.  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 No coronary artery calcifications. No  pericardial effusion.    UPPER ABDOMEN: Sleeve gastrectomy. Nonobstructing right renal calculi.    MUSCULOSKELETAL: Normal.      Impression    IMPRESSION:  1.  No pulmonary emboli. No acute findings.    KLAUS GOLDSMITH MD         SYSTEM ID:  [id number]   Troponin I   Result Value Ref Range    Troponin I High Sensitivity 6 <54 ng/L    Narrative    The sex of this patient cannot be reliably determined based on discrepancies in demographics (legal sex, sex assigned at birth, gender identity).  Both male and female reference ranges are provided where applicable.  Careful evaluation of the patient s results as compared to the gender specific reference intervals is required in this setting.            Medications   aspirin (ASA) chewable tablet 324 mg (324 mg Oral Given 5/25/22 1210)   0.9% sodium chloride BOLUS (0 mLs Intravenous Stopped 5/25/22 1507)   iopamidol (ISOVUE-370) solution 500 mL (75 mLs Intravenous Given 5/25/22 1356)   sodium chloride 0.9 % bag 100mL for CT scan  flush use (70 mLs Intravenous Given 5/25/22 0040)       Assessments & Plan (with Medical Decision Making)     Chest wall pain  Cholelithiasis     47 year old female presents for evaluation of left anterior chest discomfort radiating through to her back starting 1 hour prior to ED evaluation while she was sitting and talking to a family member.  Describes it as a pressure and squeezing discomfort associate with diaphoresis, dizziness, and nausea.  Pain is rated 7 on a scale of 10.  Risk factors of hypertension and family history for coronary artery disease.  Does have a history of costochondritis.  See HPI above for details.  On exam blood pressure 131/71, temperature 98.2, pulse 67, respiration 16, oxygen saturation 98% on room air.  Patient appears to be in discomfort.  She has reproducible left anterior chest wall discomfort.  No crepitus or step-off.  No bruising.  Cardiopulmonary exam otherwise normal.  She has significant epigastric and right upper quadrant tenderness with positive Ayala sign.  No hepatosplenomegaly.  No lower extremity edema and negative Homans' sign.  IV was established.  Laboratory levels with normal troponin at 4.  Comprehensive metabolic panel normal.  Lipase normal at 228.  CBC normal.  D-dimer at the upper limits of normal at 0.5.  Right upper quadrant ultrasound with cholelithiasis but no evidence of acute cholecystitis.  Common bile duct is normal in diameter.  No gallbladder wall thickening.  Patient with a history of OCPs and advanced age with left-sided chest discomfort and a borderline D-dimer.  Therefore, I did proceed with CT PE study and this displayed no evidence for PE or other concerning abnormality.  Repeat troponin remained stable at 6.  Therefore, I think this confidently rules out acute coronary syndrome in this situation.  She has reproducible left-sided chest wall discomfort on exam which reproduces her pain.  Also, findings of cholelithiasis without cholecystitis or  common bile duct obstruction.  Symptoms improved with Dilaudid here in the ED.  Encouraged her to stay on a low-fat diet.  Push clear fluids.  Strict ED return instructions reviewed with her.  I did provide hydrocodone in case she has any breakthrough pain.  Encouraged her to use a warm compress over the painful chest wall area.  Okay to use anti-inflammatories as needed.  No lifting over 5 pounds for the next few days.  Patient was in agreement with this plan and was suitable for discharge.     I have reviewed the nursing notes.    I have reviewed the findings, diagnosis, plan and need for follow up with the patient.       Discharge Medication List as of 5/25/2022  4:17 PM          Final diagnoses:   Chest wall pain   Cholelithiasis     Disclaimer: This note consists of symbols derived from keyboarding, dictation and/or voice recognition software. As a result, there may be errors in the script that have gone undetected. Please consider this when interpreting information found in this chart.      5/25/2022   [facility] EMERGENCY DEPT     Mohan Lange PA-C  05/25/22 1914

## 2022-05-25 NOTE — ED TRIAGE NOTES
Chest pain that started at 1100. She is also complaining of nausea and dizziness.      Triage Assessment     Row Name 05/25/22 1141       Triage Assessment (Adult)    Airway WDL WDL       Respiratory WDL    Respiratory WDL WDL       Skin Circulation/Temperature WDL    Skin Circulation/Temperature WDL WDL       Cardiac WDL    Cardiac WDL X;chest pain       Chest Pain Assessment    Chest Pain Location anterior chest, left       Peripheral/Neurovascular WDL    Peripheral Neurovascular WDL WDL       Cognitive/Neuro/Behavioral WDL    Cognitive/Neuro/Behavioral WDL WDL

## 2023-01-07 ENCOUNTER — HEALTH MAINTENANCE LETTER (OUTPATIENT)
Age: 48
End: 2023-01-07

## 2023-04-22 ENCOUNTER — HEALTH MAINTENANCE LETTER (OUTPATIENT)
Age: 48
End: 2023-04-22

## 2024-06-29 ENCOUNTER — HEALTH MAINTENANCE LETTER (OUTPATIENT)
Age: 49
End: 2024-06-29

## 2025-05-10 ENCOUNTER — HEALTH MAINTENANCE LETTER (OUTPATIENT)
Age: 50
End: 2025-05-10

## (undated) DEVICE — GOWN IMPERVIOUS SPECIALTY XL/XLONG 39049

## (undated) DEVICE — GLOVE PROTEXIS W/NEU-THERA 7.0  2D73TE70

## (undated) DEVICE — SYR 30ML LL W/O NDL

## (undated) DEVICE — TUBING CYSTO/BLADDER IRRIG SET 80" 06544-01

## (undated) DEVICE — SHEATH URETERAL ACCESS NAVIGATOR HD 11/13FRX46CM M0062502230

## (undated) DEVICE — WIRE GUIDE 0.035"X145CM BENTSON TSFB-35-145-BH

## (undated) DEVICE — ESU GROUND PAD UNIVERSAL W/O CORD

## (undated) DEVICE — GOWN XLG DISP 9545

## (undated) DEVICE — LABEL MEDICATION SYSTEM  3304

## (undated) DEVICE — PACK BASIC SET-UP 9101

## (undated) DEVICE — GUIDEWIRE SENSOR DUAL FLEX STR 0.035"X150CM M0066703080

## (undated) DEVICE — PREP POVIDONE IODINE SCRUB 7.5% 120ML

## (undated) DEVICE — TUBING SUCTION 12"X1/4" N612

## (undated) DEVICE — PREP SKIN SCRUB TRAY 4461A

## (undated) DEVICE — DRAPE GYN/UROLOGY FLUID POUCH TUR 29455

## (undated) DEVICE — LUBRICATING JELLY 4.25OZ

## (undated) DEVICE — DRSG TEGADERM 2 3/8X2 3/4" 1624W

## (undated) DEVICE — BASIN SET MINOR DISP

## (undated) DEVICE — SHEATH URETERAL ACCESS NAVIGATOR HD 12/14FRX36CM M0062502250

## (undated) DEVICE — SPECIMEN CONTAINER 3OZ

## (undated) DEVICE — WIRE GUIDE 0.035"X145CM AMPLATZ SUPER STIFF STR M001465230

## (undated) DEVICE — PEN MARKING SKIN

## (undated) DEVICE — SOL NACL 0.9% INJ 1000ML BAG 07983-09

## (undated) DEVICE — BASKET STONE EXTRACTOR NITINOL NCIRCLE 2.2FRX115CM G18788

## (undated) DEVICE — CATH URETERAL DUAL LUMEN 10FRX54CM M0064051000

## (undated) DEVICE — CATH URETERAL OPEN END 6FR M0064001510

## (undated) RX ORDER — ONDANSETRON 2 MG/ML
INJECTION INTRAMUSCULAR; INTRAVENOUS
Status: DISPENSED
Start: 2022-02-16

## (undated) RX ORDER — FENTANYL CITRATE 50 UG/ML
INJECTION, SOLUTION INTRAMUSCULAR; INTRAVENOUS
Status: DISPENSED
Start: 2022-02-16

## (undated) RX ORDER — KETOROLAC TROMETHAMINE 30 MG/ML
INJECTION, SOLUTION INTRAMUSCULAR; INTRAVENOUS
Status: DISPENSED
Start: 2022-02-16

## (undated) RX ORDER — DIMENHYDRINATE 50 MG/ML
INJECTION, SOLUTION INTRAMUSCULAR; INTRAVENOUS
Status: DISPENSED
Start: 2022-02-16

## (undated) RX ORDER — LIDOCAINE HYDROCHLORIDE 20 MG/ML
INJECTION, SOLUTION EPIDURAL; INFILTRATION; INTRACAUDAL; PERINEURAL
Status: DISPENSED
Start: 2022-02-16